# Patient Record
Sex: FEMALE | Race: BLACK OR AFRICAN AMERICAN | NOT HISPANIC OR LATINO | Employment: FULL TIME | URBAN - METROPOLITAN AREA
[De-identification: names, ages, dates, MRNs, and addresses within clinical notes are randomized per-mention and may not be internally consistent; named-entity substitution may affect disease eponyms.]

---

## 2019-06-30 ENCOUNTER — HOSPITAL ENCOUNTER (EMERGENCY)
Facility: HOSPITAL | Age: 24
Discharge: HOME | End: 2019-06-30
Attending: EMERGENCY MEDICINE
Payer: MEDICAID

## 2019-06-30 VITALS
OXYGEN SATURATION: 100 % | WEIGHT: 155 LBS | HEART RATE: 86 BPM | SYSTOLIC BLOOD PRESSURE: 133 MMHG | TEMPERATURE: 99 F | DIASTOLIC BLOOD PRESSURE: 78 MMHG | RESPIRATION RATE: 18 BRPM | HEIGHT: 61 IN | BODY MASS INDEX: 29.27 KG/M2

## 2019-06-30 DIAGNOSIS — Z34.90 PREGNANCY, UNSPECIFIED GESTATIONAL AGE: Primary | ICD-10-CM

## 2019-06-30 LAB
ANION GAP SERPL CALC-SCNC: 7 MMOL/L (ref 8–16)
B-HCG UR QL: POSITIVE
BACTERIA #/AREA URNS AUTO: ABNORMAL /HPF
BASOPHILS # BLD AUTO: 0.01 K/UL (ref 0–0.2)
BASOPHILS NFR BLD: 0.1 % (ref 0–1.9)
BILIRUB UR QL STRIP: NEGATIVE
BUN SERPL-MCNC: 13 MG/DL (ref 6–20)
CALCIUM SERPL-MCNC: 9.1 MG/DL (ref 8.7–10.5)
CHLORIDE SERPL-SCNC: 103 MMOL/L (ref 95–110)
CLARITY UR REFRACT.AUTO: ABNORMAL
CO2 SERPL-SCNC: 23 MMOL/L (ref 23–29)
COLOR UR AUTO: YELLOW
CREAT SERPL-MCNC: 0.7 MG/DL (ref 0.5–1.4)
CTP QC/QA: YES
DIFFERENTIAL METHOD: NORMAL
EOSINOPHIL # BLD AUTO: 0.1 K/UL (ref 0–0.5)
EOSINOPHIL NFR BLD: 0.8 % (ref 0–8)
ERYTHROCYTE [DISTWIDTH] IN BLOOD BY AUTOMATED COUNT: 12.9 % (ref 11.5–14.5)
EST. GFR  (AFRICAN AMERICAN): >60 ML/MIN/1.73 M^2
EST. GFR  (NON AFRICAN AMERICAN): >60 ML/MIN/1.73 M^2
GLUCOSE SERPL-MCNC: 59 MG/DL (ref 70–110)
GLUCOSE UR QL STRIP: NEGATIVE
HCG INTACT+B SERPL-ACNC: NORMAL MIU/ML
HCT VFR BLD AUTO: 37.4 % (ref 37–48.5)
HGB BLD-MCNC: 12.4 G/DL (ref 12–16)
HGB UR QL STRIP: NEGATIVE
IMM GRANULOCYTES # BLD AUTO: 0.04 K/UL (ref 0–0.04)
IMM GRANULOCYTES NFR BLD AUTO: 0.4 % (ref 0–0.5)
KETONES UR QL STRIP: NEGATIVE
LEUKOCYTE ESTERASE UR QL STRIP: NEGATIVE
LYMPHOCYTES # BLD AUTO: 2.3 K/UL (ref 1–4.8)
LYMPHOCYTES NFR BLD: 21.5 % (ref 18–48)
MCH RBC QN AUTO: 30.5 PG (ref 27–31)
MCHC RBC AUTO-ENTMCNC: 33.2 G/DL (ref 32–36)
MCV RBC AUTO: 92 FL (ref 82–98)
MICROSCOPIC COMMENT: ABNORMAL
MONOCYTES # BLD AUTO: 0.9 K/UL (ref 0.3–1)
MONOCYTES NFR BLD: 7.9 % (ref 4–15)
NEUTROPHILS # BLD AUTO: 7.5 K/UL (ref 1.8–7.7)
NEUTROPHILS NFR BLD: 69.3 % (ref 38–73)
NITRITE UR QL STRIP: NEGATIVE
NRBC BLD-RTO: 0 /100 WBC
PH UR STRIP: 7 [PH] (ref 5–8)
PLATELET # BLD AUTO: 257 K/UL (ref 150–350)
PMV BLD AUTO: 9.9 FL (ref 9.2–12.9)
POTASSIUM SERPL-SCNC: 3.7 MMOL/L (ref 3.5–5.1)
PROT UR QL STRIP: NEGATIVE
RBC # BLD AUTO: 4.06 M/UL (ref 4–5.4)
SODIUM SERPL-SCNC: 133 MMOL/L (ref 136–145)
SP GR UR STRIP: 1.02 (ref 1–1.03)
URN SPEC COLLECT METH UR: ABNORMAL
WBC # BLD AUTO: 10.8 K/UL (ref 3.9–12.7)
WBC #/AREA URNS AUTO: 12 /HPF (ref 0–5)

## 2019-06-30 PROCEDURE — 80048 BASIC METABOLIC PNL TOTAL CA: CPT

## 2019-06-30 PROCEDURE — 87186 SC STD MICRODIL/AGAR DIL: CPT | Mod: 59

## 2019-06-30 PROCEDURE — 84702 CHORIONIC GONADOTROPIN TEST: CPT

## 2019-06-30 PROCEDURE — 99283 EMERGENCY DEPT VISIT LOW MDM: CPT

## 2019-06-30 PROCEDURE — 87088 URINE BACTERIA CULTURE: CPT

## 2019-06-30 PROCEDURE — 99282 EMERGENCY DEPT VISIT SF MDM: CPT | Mod: ,,, | Performed by: EMERGENCY MEDICINE

## 2019-06-30 PROCEDURE — 87077 CULTURE AEROBIC IDENTIFY: CPT

## 2019-06-30 PROCEDURE — 81001 URINALYSIS AUTO W/SCOPE: CPT

## 2019-06-30 PROCEDURE — 87086 URINE CULTURE/COLONY COUNT: CPT

## 2019-06-30 PROCEDURE — 81025 URINE PREGNANCY TEST: CPT | Performed by: EMERGENCY MEDICINE

## 2019-06-30 PROCEDURE — 99282 PR EMERGENCY DEPT VISIT,LEVEL II: ICD-10-PCS | Mod: ,,, | Performed by: EMERGENCY MEDICINE

## 2019-06-30 PROCEDURE — 85025 COMPLETE CBC W/AUTO DIFF WBC: CPT

## 2019-06-30 NOTE — ED NOTES
LLQ abd pain that began a few minutes ago. LMP May 4th. UPT + at beginning of this month. Pt has not seen OB yet. Denies vaginal bleeding or spotting. Mirena IUD was removed about one month.

## 2019-07-01 NOTE — ED PROVIDER NOTES
Encounter Date: 6/30/2019    SCRIBE #1 NOTE: I, Isi Briceno, am scribing for, and in the presence of,  Dr. Amanda. I have scribed the entire note.       History     Chief Complaint   Patient presents with    Abdominal Pain     cramping took home preg test +     24 year old female with PMHx of seizures presents to the Ed with slight abdominal cramping to the left side. Patient states that while she previously had intermittent cramping pain, it is currently resolved. Patient states that her last menstrual period was May 4th, and upon taking a pregnancy test at the beginning of June, it was positive. She has had 2 previous pregnancies. Patient also endorses nausea. She denies any back pain, shoulder pain, chest pain, and vaginal bleeding.     She goes to [facility] for OB care, but has not seen a physician regarding her current pregnancy.     The history is provided by the patient.     Review of patient's allergies indicates:   Allergen Reactions    Tree nut Hives     PEANUTS!!!     Past Medical History:   Diagnosis Date    Seizures      History reviewed. No pertinent surgical history.  Family History   Problem Relation Age of Onset    Hypertension Maternal Grandmother     Diabetes Maternal Grandmother     Breast cancer Neg Hx     Colon cancer Neg Hx     Ovarian cancer Neg Hx      Social History     Tobacco Use    Smoking status: Never Smoker   Substance Use Topics    Alcohol use: No    Drug use: No     Review of Systems   Constitutional: Negative for fever.   HENT: Negative for sore throat.    Respiratory: Negative for shortness of breath.    Cardiovascular: Negative for chest pain.   Gastrointestinal: Positive for abdominal pain (Cramping) and nausea.   Genitourinary: Negative for dysuria, vaginal bleeding and vaginal pain.        Pregnant.   Musculoskeletal: Negative for back pain.   Skin: Negative for rash.   Neurological: Negative for weakness.   Hematological: Does not  bruise/bleed easily.       Physical Exam     Initial Vitals [06/30/19 1829]   BP Pulse Resp Temp SpO2   133/80 97 18 98.3 °F (36.8 °C) 100 %      MAP       --         Physical Exam    Nursing note and vitals reviewed.  Constitutional: She is cooperative. She does not appear ill.   HENT:   Head: Normocephalic and atraumatic.   Mouth/Throat: Mucous membranes are normal.   Eyes: No scleral icterus.   Neck: Normal range of motion. Neck supple.   Cardiovascular: Normal rate, regular rhythm and normal heart sounds.   Pulmonary/Chest: No accessory muscle usage. No tachypnea. No respiratory distress.   Abdominal: She exhibits no distension. There is no tenderness.   Musculoskeletal: Normal range of motion.   Neurological: She is alert.   Skin: Skin is warm and dry.         ED Course   Procedures  Labs Reviewed   CULTURE, URINE - Abnormal; Notable for the following components:       Result Value    Urine Culture, Routine   (*)     Value: ESCHERICHIA COLI  >100,000 cfu/ml      All other components within normal limits    Narrative:     Preferred Collection Type->Urine, Clean Catch   BASIC METABOLIC PANEL - Abnormal; Notable for the following components:    Sodium 133 (*)     Glucose 59 (*)     Anion Gap 7 (*)     All other components within normal limits   URINALYSIS, REFLEX TO URINE CULTURE - Abnormal; Notable for the following components:    Appearance, UA Cloudy (*)     All other components within normal limits    Narrative:     Preferred Collection Type->Urine, Clean Catch   URINALYSIS MICROSCOPIC - Abnormal; Notable for the following components:    WBC, UA 12 (*)     Bacteria Many (*)     All other components within normal limits    Narrative:     Preferred Collection Type->Urine, Clean Catch   POCT URINE PREGNANCY - Abnormal; Notable for the following components:    POC Preg Test, Ur Positive (*)     All other components within normal limits   CBC W/ AUTO DIFFERENTIAL   HCG, QUANTITATIVE, PREGNANCY          Imaging  Results    None          Medical Decision Making:   History:   Old Medical Records: I decided to obtain old medical records.  Initial Assessment:   24 year old comes in with very transient LLQ discomfort. She thinks she is pregnant. No vaginal bleeding.   Clinical Tests:   Lab Tests: Ordered and Reviewed              Attending Attestation:             Attending ED Notes:   Patient with a transient left lower quadrant discomfort and states that she has had a positive pregnancy test has not followed up with anyone patient had labs drawn including a beta HCG and these results were given to the patient and she is to follow up with an OB doctor at [facility] as she is previously seen before             Clinical Impression:       ICD-10-CM ICD-9-CM   1. Pregnancy, unspecified gestational age Z34.90 V22.2         Disposition:   Disposition: Discharged  Condition: Stable                        Navneet Amanda MD  07/07/19 0720

## 2019-07-03 LAB — BACTERIA UR CULT: ABNORMAL

## 2019-07-16 LAB
C TRACH RRNA SPEC QL PROBE: NORMAL
HBV SURFACE AG SERPL QL IA: NEGATIVE
HCT VFR BLD AUTO: 37 % (ref 36–46)
HGB BLD-MCNC: 12.3 G/DL (ref 12–16)
HIV 1+2 AB+HIV1 P24 AG SERPL QL IA: NORMAL
INDIRECT COOMBS: NORMAL
MCV RBC AUTO: 93 FL (ref 82–108)
N GONORRHOEAE, AMPLIFIED DNA: NORMAL
PLATELET # BLD AUTO: 285 K/?L (ref 150–399)
RPR: NORMAL
RUBELLA IMMUNE STATUS: 2.61

## 2019-08-06 ENCOUNTER — INITIAL PRENATAL (OUTPATIENT)
Dept: OBSTETRICS AND GYNECOLOGY | Facility: CLINIC | Age: 24
End: 2019-08-06
Payer: MEDICAID

## 2019-08-06 VITALS
WEIGHT: 162.94 LBS | DIASTOLIC BLOOD PRESSURE: 64 MMHG | SYSTOLIC BLOOD PRESSURE: 118 MMHG | BODY MASS INDEX: 30.78 KG/M2

## 2019-08-06 DIAGNOSIS — O30.042 DICHORIONIC DIAMNIOTIC TWIN PREGNANCY IN SECOND TRIMESTER: Primary | ICD-10-CM

## 2019-08-06 DIAGNOSIS — O09.92 SUPERVISION OF HIGH RISK PREGNANCY IN SECOND TRIMESTER: ICD-10-CM

## 2019-08-06 PROBLEM — O30.049 DICHORIONIC DIAMNIOTIC TWIN PREGNANCY: Status: ACTIVE | Noted: 2019-08-06

## 2019-08-06 PROBLEM — O09.90 PREGNANCY, SUPERVISION, HIGH-RISK: Status: ACTIVE | Noted: 2019-08-06

## 2019-08-06 PROCEDURE — 99999 PR PBB SHADOW E&M-EST. PATIENT-LVL III: ICD-10-PCS | Mod: PBBFAC,,, | Performed by: STUDENT IN AN ORGANIZED HEALTH CARE EDUCATION/TRAINING PROGRAM

## 2019-08-06 PROCEDURE — 99202 PR OFFICE/OUTPT VISIT, NEW, LEVL II, 15-29 MIN: ICD-10-PCS | Mod: TH,S$PBB,, | Performed by: STUDENT IN AN ORGANIZED HEALTH CARE EDUCATION/TRAINING PROGRAM

## 2019-08-06 PROCEDURE — 87086 URINE CULTURE/COLONY COUNT: CPT

## 2019-08-06 PROCEDURE — 99202 OFFICE O/P NEW SF 15 MIN: CPT | Mod: TH,S$PBB,, | Performed by: STUDENT IN AN ORGANIZED HEALTH CARE EDUCATION/TRAINING PROGRAM

## 2019-08-06 PROCEDURE — 99999 PR PBB SHADOW E&M-EST. PATIENT-LVL III: CPT | Mod: PBBFAC,,, | Performed by: STUDENT IN AN ORGANIZED HEALTH CARE EDUCATION/TRAINING PROGRAM

## 2019-08-06 PROCEDURE — 99213 OFFICE O/P EST LOW 20 MIN: CPT | Mod: PBBFAC,TH,PO | Performed by: STUDENT IN AN ORGANIZED HEALTH CARE EDUCATION/TRAINING PROGRAM

## 2019-08-06 RX ORDER — FOLIC ACID 1 MG/1
1 TABLET ORAL DAILY
Qty: 100 TABLET | Refills: 3 | Status: ON HOLD | OUTPATIENT
Start: 2019-08-06 | End: 2019-12-29 | Stop reason: HOSPADM

## 2019-08-06 RX ORDER — ASPIRIN 81 MG/1
81 TABLET ORAL DAILY
Qty: 30 TABLET | Refills: 8 | Status: ON HOLD | OUTPATIENT
Start: 2019-08-06 | End: 2019-12-29 | Stop reason: HOSPADM

## 2019-08-06 NOTE — PROGRESS NOTES
Complaints today: fatigue. Last HH normal. Is taking PNV.    OB:  Denies contractions  Denies vb   Denies LOF  Endorses fetal movement    /64   Wt 73.9 kg (162 lb 14.7 oz)   LMP 2019   Breastfeeding? Unknown   BMI 30.78 kg/m²     24 y.o., at 14w0d by Estimated Date of Delivery: 20  Patient Active Problem List   Diagnosis    Pregnancy, supervision, high-risk    Dichorionic diamniotic twin pregnancy     OB History    Para Term  AB Living   3 2 2     2   SAB TAB Ectopic Multiple Live Births         0 2      # Outcome Date GA Lbr Eric/2nd Weight Sex Delivery Anes PTL Lv   3 Current            2 Term 02/15/18 40w0d  3.09 kg (6 lb 13 oz) M Vag-Spont  N WILEY   1 Term 14 39w1d  3.075 kg (6 lb 12.5 oz) M Vag-Spont Spinal, EPI N WILEY      Complications: Nuchal cord       Dating reviewed    Allergies and problem list reviewed and updated    Medical and surgical history reviewed    Prenatal labs reviewed and updated    Physical Exam:  CV: regular rate and rhythm  Pulm: CTAB  ABD: soft, gravid, nontender, fundus firm    Assessment:  Miguel Angel was seen today for initial prenatal visit.    Diagnoses and all orders for this visit:    Dichorionic diamniotic twin pregnancy in second trimester  -     folic acid (FOLVITE) 1 MG tablet; Take 1 tablet (1 mg total) by mouth once daily.  -     Ambulatory consult to Maternal Fetal Medicine  -     US MFM Procedure (Viewpoint); Future    Supervision of high risk pregnancy in second trimester  -     Urine culture    Other orders  -     aspirin (ECOTRIN) 81 MG EC tablet; Take 1 tablet (81 mg total) by mouth once daily.        Plan:    follow up 4 Weeks, kick counts, labor precautions

## 2019-08-07 LAB — BACTERIA UR CULT: NORMAL

## 2019-09-10 ENCOUNTER — INITIAL CONSULT (OUTPATIENT)
Dept: MATERNAL FETAL MEDICINE | Facility: CLINIC | Age: 24
End: 2019-09-10
Payer: MEDICAID

## 2019-09-10 ENCOUNTER — PROCEDURE VISIT (OUTPATIENT)
Dept: MATERNAL FETAL MEDICINE | Facility: CLINIC | Age: 24
End: 2019-09-10
Payer: MEDICAID

## 2019-09-10 VITALS
DIASTOLIC BLOOD PRESSURE: 74 MMHG | BODY MASS INDEX: 31.41 KG/M2 | WEIGHT: 166.25 LBS | SYSTOLIC BLOOD PRESSURE: 118 MMHG

## 2019-09-10 DIAGNOSIS — Z36.89 ENCOUNTER FOR FETAL ANATOMIC SURVEY: ICD-10-CM

## 2019-09-10 DIAGNOSIS — Z36.89 ENCOUNTER FOR ULTRASOUND TO CHECK FETAL GROWTH: Primary | ICD-10-CM

## 2019-09-10 DIAGNOSIS — O30.042 DICHORIONIC DIAMNIOTIC TWIN PREGNANCY IN SECOND TRIMESTER: ICD-10-CM

## 2019-09-10 PROCEDURE — 99212 OFFICE O/P EST SF 10 MIN: CPT | Mod: PBBFAC,TH,25 | Performed by: OBSTETRICS & GYNECOLOGY

## 2019-09-10 PROCEDURE — 76817 TRANSVAGINAL US OBSTETRIC: CPT | Mod: PBBFAC | Performed by: OBSTETRICS & GYNECOLOGY

## 2019-09-10 PROCEDURE — 99204 OFFICE O/P NEW MOD 45 MIN: CPT | Mod: 25,S$PBB,TH, | Performed by: OBSTETRICS & GYNECOLOGY

## 2019-09-10 PROCEDURE — 76812 OB US DETAILED ADDL FETUS: CPT | Mod: PBBFAC | Performed by: OBSTETRICS & GYNECOLOGY

## 2019-09-10 PROCEDURE — 76817 PR US, OB, TRANSVAG APPROACH: ICD-10-PCS | Mod: 26,S$PBB,, | Performed by: OBSTETRICS & GYNECOLOGY

## 2019-09-10 PROCEDURE — 99204 PR OFFICE/OUTPT VISIT, NEW, LEVL IV, 45-59 MIN: ICD-10-PCS | Mod: 25,S$PBB,TH, | Performed by: OBSTETRICS & GYNECOLOGY

## 2019-09-10 PROCEDURE — 99999 PR PBB SHADOW E&M-EST. PATIENT-LVL II: ICD-10-PCS | Mod: PBBFAC,,, | Performed by: OBSTETRICS & GYNECOLOGY

## 2019-09-10 PROCEDURE — 99999 PR PBB SHADOW E&M-EST. PATIENT-LVL II: CPT | Mod: PBBFAC,,, | Performed by: OBSTETRICS & GYNECOLOGY

## 2019-09-10 PROCEDURE — 76811 OB US DETAILED SNGL FETUS: CPT | Mod: 26,S$PBB,, | Performed by: OBSTETRICS & GYNECOLOGY

## 2019-09-10 PROCEDURE — 76811 PR US, OB FETAL EVAL & EXAM, TRANSABDOM,FIRST GESTATION: ICD-10-PCS | Mod: 26,S$PBB,, | Performed by: OBSTETRICS & GYNECOLOGY

## 2019-09-10 PROCEDURE — 76812 OB US DETAILED ADDL FETUS: ICD-10-PCS | Mod: 26,S$PBB,, | Performed by: OBSTETRICS & GYNECOLOGY

## 2019-09-10 PROCEDURE — 76817 TRANSVAGINAL US OBSTETRIC: CPT | Mod: 26,S$PBB,, | Performed by: OBSTETRICS & GYNECOLOGY

## 2019-09-10 PROCEDURE — 76812 OB US DETAILED ADDL FETUS: CPT | Mod: 26,S$PBB,, | Performed by: OBSTETRICS & GYNECOLOGY

## 2019-09-10 PROCEDURE — 76811 OB US DETAILED SNGL FETUS: CPT | Mod: PBBFAC | Performed by: OBSTETRICS & GYNECOLOGY

## 2019-09-10 NOTE — LETTER
September 13, 2019      Ana Laura Douglas MD  [address]  [address]  [place] [address]           [facility] [address]  [address]  [place] [address]  Phone: [phone]          Patient: Miguel Angel Moreno   MR Number: [id number]   YOB: 1995   Date of Visit: 9/10/2019       Dear Dr. Ana Laura Douglas:    Thank you for referring Miguel Angel Moreno to me for evaluation. Attached you will find relevant portions of my assessment and plan of care.    If you have questions, please do not hesitate to call me. I look forward to following Miguel Angel Moreno along with you.    Sincerely,    Hannah Terrazas MD    Enclosure  CC:  No Recipients    If you would like to receive this communication electronically, please contact [email or web address] or [phone] to request more information on [employer] access.    For providers and/or their staff who would like to refer a patient to [facility], please contact us through our one-stop-shop provider referral line, [facility], at [phone].    If you feel you have received this communication in error or would no longer like to receive these types of communications, please e-mail [email or web address]

## 2019-09-13 NOTE — PROGRESS NOTES
Indication  ========    Twin fetal anatomy survey    Maternal Assessment  =================    BP syst 118 mmHg  BP diast 74 mmHg    Method  ======    Transabdominal ultrasound examination, Transvaginal ultrasound examination, Voluson E10. View: Suboptimal view: limited by fetal position    Pregnancy  =========    Twin pregnancy. Dichorionic-diamniotic. Number of fetuses: 2    Dating  ======    GA by prior assessment 19 w + 0 d  NOLBERTO by prior assessment: 2/4/2020  Ultrasound examination on: 9/10/2019  GA by U/S based upon: AC, BPD, Femur, HC  GA by U/S 19 w + 1 d  NOLBERTO by U/S: 2/3/2020  GA by U/S based upon (Fetus 2): AC, BPD, Femur, HC  GA by U/S (Fetus 2) 18 w + 6 d  NOLBERTO by U/S (Fetus 2): 2/5/2020  Assigned: based on stated NOLBERTO, selected on 09/10/2019  Assigned GA 19 w + 0 d  Assigned NOLBERTO: 2/4/2020  Pregnancy length 280 d    General Evaluation (Fetus 1)  =====================    Cardiac activity present.  bpm.  Fetal movements visualized.  Presentation cephalic, rlq.  Placenta anterior.  Umbilical cord normal, 3 vessel cord, normal insertion.  Amniotic fluid Amount of AF: normal amount. MVP 4.7 cm.    Fetal Biometry (Fetus 1)  ===================    Standard  BPD 45.5 mm  19w 5d                Hadlock    OFD 54.5 mm  19w 3d                Garima    .9 mm  18w 6d                Hadlock    Cerebellum tr 19.3 mm  19w 2d                Barragan    Nuchal fold 4.6 mm  .8 mm  18w 5d                Hadlock    Femur 29.4 mm  19w 0d                Hadlock    Humerus 28.6 mm  19w 2d                Garima    HC / AC 1.20     g    EFW discordance 1.9 %  EFW (lb) 0 lb  EFW (oz) 9 oz  EFW by: Hadlock (BPD-HC-AC-FL)  Extended   5.1 mm  CM 3.3 mm    Head / Face / Neck  Cephalic index 0.83    Extremities / Bony Struc  FL / BPD 0.65    FL / AC 0.22    Other Structures   bpm    Fetal Anatomy (Fetus 1)  ==================    Cranium: normal  Lateral ventricles: normal  Choroid plexus: normal  Midline  falx: normal  Cavum septi pellucidi: normal  Cerebellum: normal  Cisterna magna: normal  Head / Neck  Neck: suboptimal  Lips: suboptimal  Profile: suboptimal  Nose: suboptimal  RVOT view: suboptimal  LVOT view: suboptimal  Heart / Thorax  4-chamber view: 4-chamber suboptimal, septum suboptimal  Situs: situs solitus (normal)  Aortic arch view: normal  Ductal arch view: normal  SVC: normal  IVC: normal  3-vessel view: suboptimal  3-vessel-trachea view: suboptimal  Cardiac rhythm: regular (normal)  Rt lung: normal  Lt lung: normal  Diaphragm: suboptimal  Cord insertion: normal  Stomach: normal  Kidneys: normal  Bladder: normal  Genitals: normal  Abdomen  Liver: normal  Bowel: normal  Rt renal artery: suboptimal  Lt renal artery: suboptimal  Spine: sagittal normal at all levels, transverse suboptimal  Arms: both visualized  Legs: both visualized  Rt arm: normal  Lt arm: normal  Rt hand: visualized  Lt hand: visualized  Rt leg: normal  Lt leg: normal  Rt foot: visualized  Lt foot: visualized  Position of feet: normal  Gender: male  Wants to know gender: yes    General Evaluation (Fetus 2)  =====================    Cardiac activity present.  bpm.  Fetal movements visualized.  Presentation vertex, luq.  Placenta posterior.  Umbilical cord 3 vessel cord.  Amniotic fluid normal amount, MVP 3.9 cm.    Fetal Biometry (Fetus 2)  ===================    Standard  BPD 42.3 mm  18w 6d                Hadlock    OFD 54.4 mm  19w 2d                Garima    .6 mm  18w 4d                Hadlock    Cerebellum tr 19.3 mm  19w 2d                Barragan    Nuchal fold 3.9 mm  .1 mm  19w 2d                Hadlock    Femur 28.8 mm  18w 6d                Hadlock    Humerus 27.2 mm  18w 5d                Garima    HC / AC 1.13     g    EFW discordance 1.9 %  EFW (lb) 0 lb  EFW (oz) 10 oz  EFW by: Hadlock (BPD-HC-AC-FL)  Extended   7.2 mm  CM 3.3 mm    Nasal bone 5.7 mm    Head / Face / Neck  Cephalic  index 0.78    Extremities / Bony Struc  FL / BPD 0.68    FL / AC 0.21    Other Structures   bpm    Fetal Anatomy (Fetus 2)  ==================    Cranium: normal  Lateral ventricles: normal  Choroid plexus: normal  Midline falx: normal  Cavum septi pellucidi: suboptimal  Cerebellum: normal  Cisterna magna: normal  Head / Neck  Neck: normal  Nuchal fold: normal  Lips: normal  Profile: normal  Nose: normal  RVOT view: suboptimal  LVOT view: suboptimal  Heart / Thorax  4-chamber view: 4-chamber suboptimal, septum suboptimal  Situs: situs solitus (normal)  Aortic arch view: suboptimal  Ductal arch view: suboptimal  SVC: suboptimal  IVC: normal  3-vessel view: suboptimal  3-vessel-trachea view: suboptimal  Cardiac rhythm: regular (normal)  Rt lung: suboptimal  Lt lung: suboptimal  Diaphragm: normal  Cord insertion: normal  Stomach: normal  Kidneys: normal  Bladder: normal  Genitals: normal  Abdomen  Liver: normal  Bowel: normal  Rt renal artery: visualized  Lt renal artery: visualized  Spine: transverse spine normal at all levels, sagittal suboptimal at all levels  Arms: both visualized  Legs: both visualized  Rt arm: normal  Lt arm: normal  Rt hand: visualized  Lt hand: visualized  Rt leg: normal  Lt leg: normal  Rt foot: visualized  Lt foot: visualized  Gender: male  Wants to know gender: yes    Maternal Structures  ===============    Uterus / Cervix  Approach: Transvaginal  Cervical length 43.5 mm    Consultation  ==========    Type: Twins/seizures  The patient is a 24-year-old  002 who is at 19 weeks gestation based on a first-trimester ultrasound. Her doctors elected to use the  ultrasound rather than LMP dating. She denies any history of seizure disorder except for seizure 1 time when she was a baby and she is  unsure if this is a febrile seizure.    Past medical history: History of a seizure 1 time when she was a baby  past surgical history: None  social history: None  family history of birth defects:  None, no history of sickle cell  medications: Prenatal vitamins, and she was on antibiotics for urinary tract infection  past OB history: Spontaneous vaginal delivery at 39 and 1 weeks gestation baby was 6 lb 12.5 oz, spontaneous vaginal delivery at 40 weeks  gestation baby was 6 lb 13 oz  allergies: Tree nuts, peanuts and nuts and general  decline screening and diagnostic testing for aneuploidy and open neural tube defects    assessment and plan:  1. IUP at 19 weeks - dichorionic diamniotic twin gestation  2: History of seizures: Is highly unlikely that this will impact her pregnancy. She does not have a history of epilepsy. She had 1 seizure as a  child and it is possible this could have been a febrile seizure.  3. Dichorionic diamniotic twin gestation:  I counseled the patient with the risks associated with twin pregnancy. These include but are not limited to  labor and  delivery,gestational diabetes, preeclampsia, IUGR and/or discordant twins, malpresentation, congenital anomalies, postpartum hemorrhage,  demise, and   delivery. The patient voiced understanding of these risks. We discussed that a twin gestation increases the risk of nearly all  pregnancy related complications. We also discussed the increased risk of cerebral palsy in multiple gestations. A detailed fetal anatomic  survey is recommended at 19 to 20 weeks gestation. Subsequent to this, serial growth ultrasounds are recommended every 4 weeks provided  fetal growth remains normal.   fetal surveillance is recommended at 32 weeks with weekly BPP or weekly NST/MVP. If discordance (difference in EFW's > 20%) or  other complications develop, then more intense  fetal surveillance should be performed.  Given the multiple gestation, I would recommend increasing her folic acid supplementation by at least another 1mg daily and consideration of a  dosage of 4mg total daily.  To reduce the risk of preeclampsia, I would recommend  baby aspirin 81mg PO daily after 12-14 weeks of gestation.  Recommend delivery at 38 weeks or earlier if indicated.    Genetic screening in twins:We discussed the patient's age related risk of aneuploidy. We discussed if the twins are dizygous that her age  related risk is the risk for each fetus but that the total aneuploidy risk for the mother is higher due to the multiple fetuses. Monozygous twins  most often have the same chromosomal complement and the risk of carrying aneuploid fetuses is similar to the age risk of the mother. Twins of  discordant gender are typically dizygous. Wereviewed second trimester screening detecting about 50% of cases of Down syndrome in twins.  The patient was also informed of the options of diagnostic testing for aneuploidy via amniocentesis and the risks associated with this vs in  chavez pregnancies. We discussed that ACOG does not recommend that NIPT be performed on twins given the limited data. Patient  declines screening/diagnostic testing for aneuploidy/ONTD.    45 min was spent face-to-face time with greater than half that time spent in counseling and coordination of care.      Impression  =========    Live dichorionic/diamniotic twin intrauterine pregnancy.  Fetus A: Biometry agrees with the clinical dating.  Normal amniotic fluid volume.  Some of the fetal anatomy was suboptimally visualized. The fetal anatomy that was seen appeared normal.  Placenta is anterior without previa.  Fetus B: Biometry agrees with the clinical dating.  Normal amniotic fluid volume.  Some of the fetal anatomy was suboptimally visualized. The fetal anatomy that was seen appeared normal.  Placenta is posterior without previa.  Transvaginal cervical length is normal.  1.9% discordance.    Recommendation  ==============    Follow up ultrasound in 4 weeks for growth/suboptimal fetal anatomy.  Patient declined screening/diagnostic testing for aneuploidy.  Baby asa daily and primary ob should be sure  appropriate folic acid supplement.  Serial growth,  fetal surveillance at 32 weeks, delivery at 38 weeks if no indication for earlier delivery.  See note above.

## 2019-10-10 ENCOUNTER — PROCEDURE VISIT (OUTPATIENT)
Dept: MATERNAL FETAL MEDICINE | Facility: CLINIC | Age: 24
End: 2019-10-10
Payer: MEDICAID

## 2019-10-10 DIAGNOSIS — Z36.89 ENCOUNTER FOR ULTRASOUND TO CHECK FETAL GROWTH: ICD-10-CM

## 2019-10-10 DIAGNOSIS — O30.042 DICHORIONIC DIAMNIOTIC TWIN PREGNANCY IN SECOND TRIMESTER: ICD-10-CM

## 2019-10-10 PROCEDURE — 76816 OB US FOLLOW-UP PER FETUS: CPT | Mod: 26,S$PBB,, | Performed by: OBSTETRICS & GYNECOLOGY

## 2019-10-10 PROCEDURE — 76816 PR  US,PREGNANT UTERUS,F/U,TRANSABD APP: ICD-10-PCS | Mod: 26,59,S$PBB, | Performed by: OBSTETRICS & GYNECOLOGY

## 2019-10-10 PROCEDURE — 76816 OB US FOLLOW-UP PER FETUS: CPT | Mod: 59,PBBFAC | Performed by: OBSTETRICS & GYNECOLOGY

## 2019-10-29 ENCOUNTER — TELEPHONE (OUTPATIENT)
Dept: OBSTETRICS AND GYNECOLOGY | Facility: CLINIC | Age: 24
End: 2019-10-29

## 2019-10-29 NOTE — TELEPHONE ENCOUNTER
----- Message from Jairo Cool sent at 10/29/2019 12:12 PM CDT -----  Contact: BROOKLYN DRAPER [[id number]]  Name of Who is Calling: BROOKLYN DRAPER [[id number]]      What is the request in detail: Would like to speak with staff in regards to knowing if [facility] paperwork was received. Please advise      Can the clinic reply by [facility]: yes      What Number to Call Back if not in [facility]: [phone]

## 2019-10-29 NOTE — TELEPHONE ENCOUNTER
Returned call. Emailed disability cover sheet to [email or web address] per pt request. Pt voiced understanding.

## 2019-10-29 NOTE — TELEPHONE ENCOUNTER
Has not been seen since august.  Please schedule prenatal asap.  Please ask if she has changed providers.

## 2019-11-12 ENCOUNTER — INITIAL CONSULT (OUTPATIENT)
Dept: MATERNAL FETAL MEDICINE | Facility: CLINIC | Age: 24
End: 2019-11-12
Payer: MEDICAID

## 2019-11-12 ENCOUNTER — PROCEDURE VISIT (OUTPATIENT)
Dept: MATERNAL FETAL MEDICINE | Facility: CLINIC | Age: 24
End: 2019-11-12
Payer: MEDICAID

## 2019-11-12 DIAGNOSIS — O35.EXX0 FETAL RENAL ANOMALY, SINGLE GESTATION: ICD-10-CM

## 2019-11-12 DIAGNOSIS — O30.043 DICHORIONIC DIAMNIOTIC TWIN PREGNANCY IN THIRD TRIMESTER: ICD-10-CM

## 2019-11-12 DIAGNOSIS — Z36.89 ENCOUNTER FOR ULTRASOUND TO CHECK FETAL GROWTH: ICD-10-CM

## 2019-11-12 DIAGNOSIS — O30.042 DICHORIONIC DIAMNIOTIC TWIN PREGNANCY IN SECOND TRIMESTER: ICD-10-CM

## 2019-11-12 PROCEDURE — 99213 PR OFFICE/OUTPT VISIT, EST, LEVL III, 20-29 MIN: ICD-10-PCS | Mod: 25,S$PBB,TH, | Performed by: OBSTETRICS & GYNECOLOGY

## 2019-11-12 PROCEDURE — 76816 OB US FOLLOW-UP PER FETUS: CPT | Mod: 59,PBBFAC | Performed by: OBSTETRICS & GYNECOLOGY

## 2019-11-12 PROCEDURE — 76816 PR  US,PREGNANT UTERUS,F/U,TRANSABD APP: ICD-10-PCS | Mod: 26,59,S$PBB, | Performed by: OBSTETRICS & GYNECOLOGY

## 2019-11-12 PROCEDURE — 99213 OFFICE O/P EST LOW 20 MIN: CPT | Mod: 25,S$PBB,TH, | Performed by: OBSTETRICS & GYNECOLOGY

## 2019-11-12 PROCEDURE — 76816 OB US FOLLOW-UP PER FETUS: CPT | Mod: 26,S$PBB,, | Performed by: OBSTETRICS & GYNECOLOGY

## 2019-11-12 NOTE — LETTER
November 12, 2019      Ana Luara Douglas MD  [address]  [address]  [facility] [place] [facility] [address]           [facility] [address]  [address]  [facility] [address]  Phone: [phone]          Patient: Miguel Angel Moreno   MR Number: [id number]   YOB: 1995   Date of Visit: 11/12/2019       Dear Dr. Ana Laura Douglas:    Thank you for referring Miguel Angel Moreno to me for evaluation. Attached you will find relevant portions of my assessment and plan of care.    If you have questions, please do not hesitate to call me. I look forward to following Miguel Angel Moreno along with you.    Sincerely,    Margaret Villarreal MD    Enclosure  CC:  No Recipients    If you would like to receive this communication electronically, please contact [email or web address] or [phone] to request more information on [employer] Link access.    For providers and/or their staff who would like to refer a patient to [facility], please contact us through our one-stop-shop provider referral line, [facility], at [phone].    If you feel you have received this communication in error or would no longer like to receive these types of communications, please e-mail [email or web address]

## 2019-11-12 NOTE — PROGRESS NOTES
OB Ultrasound Report and consult note (see PDF version under imaging tab)    Indication  ========    Follow-up evaluation of anatomy. Follow-up evaluation for fetal growth; merlene twins    Pregnancy History  ===============    Genetic screening declined    Fetal Growth Overview (Fetus 1)  ========================    Exam date         GA              BPD (mm)         HC (mm)        AC (mm)        FL (mm)         HL (mm)        EFW (g)  09/10/2019        19w 0d        45.5                  159.9             132.8             29.4              28.6              265  10/10/2019        23w 2d        57.6                  210.8             180.7             40.1                                   552    32%  11/12/2019        28w 0d         71.9                 257.6             229.5             51.3                                   1,087    36%      Fetal Growth Overview (Fetus 2)  ========================    Exam date         GA              BPD (mm)         HC (mm)        AC (mm)        FL (mm)         HL (mm)        EFW (g)  09/10/2019        19w 0d        42.3                  156.6             139.1             28.8              27.2              270  10/10/2019        23w 2d        56.6                  215.7             188.3             39.1                                   572    38%  11/12/2019        28w 0d         70.1                 263.6             238.9             52.3                                   1,178    48%      Method  ======    Transabdominal ultrasound examination, 2D Color Doppler, Voluson E10. View: Good view    Pregnancy  =========    Twin pregnancy. Dichorionic-diamniotic. Number of fetuses: 2    Dating  ======    Cycle: regular cycle,  GA by prior assessment 28 w + 0 d  NOLBERTO by prior assessment: 2/4/2020  Ultrasound examination on: 11/12/2019  GA by U/S based upon: AC, BPD, Femur, HC  GA by U/S 27 w + 6 d  NOLBERTO by U/S: 2/5/2020  GA by U/S based upon (Fetus 2): AC, BPD, Femur, HC  GA by U/S  (Fetus 2) 28 w + 2 d  NOLBERTO by U/S (Fetus 2): 2/2/2020  Assigned: based on stated NOLBERTO, selected on 09/10/2019  Assigned GA 28 w + 0 d  Assigned NOLBERTO: 2/4/2020  Pregnancy length 280 d    General Evaluation (Fetus 1)  ======================    Cardiac activity present.  bpm.  Fetal movements visualized.  Presentation cephalic; rlq.  Placenta Placental site: anterior.  Umbilical cord Cord vessels: 3 vessel cord.  Amniotic fluid Amount of AF: normal amount. MVP 4.3 cm.    Fetal Biometry (Fetus 1)  ===================    Standard  BPD 71.9 mm  28w 6d                Hadlock    OFD 90.2 mm  29w 0d                Garima    .6 mm  28w 0d                Hadlock    .5 mm  27w 2d                Hadlock    Femur 51.3 mm  27w 3d                Hadlock    HC / AC 1.12    EFW 1,087 g          36%        Selvin    EFW discordance 7.7 %  EFW (lb) 2 lb  EFW (oz) 6 oz  EFW by: Hadlock (BPD-HC-AC-FL)  Head / Face / Neck  Cephalic index 0.80    Extremities / Bony Struc  FL / BPD 0.71    FL / AC 0.22    Other Structures   bpm    Fetal Anatomy (Fetus 1)  ==================    Cranium: normal  4-chamber view: normal  RVOT view: normal  LVOT view: normal  Stomach: normal  Kidneys: normal  Bladder: normal  Gender: male  Wants to know gender: yes    General Evaluation (Fetus 2)  ======================    Cardiac activity present.  bpm.  Fetal movements visualized.  Presentation breech; luq.  Placenta Placental site: posterior, left.  Umbilical cord Cord vessels: 3 vessel cord.  Amniotic fluid Amount of AF: normal amount. MVP 7.5 cm.    Fetal Biometry (Fetus 2)  ===================    Standard  BPD 70.1 mm  28w 1d                Hadlock    OFD 94.8 mm  30w 4d                Garima    .6 mm  28w 5d                Hadlock    .9 mm  28w 1d                Hadlock    Femur 52.3 mm  27w 6d                Hadlock    HC / AC 1.10    EFW 1,178 g          48%        Selvin    EFW discordance 7.7 %  EFW  (lb) 2 lb  EFW (oz) 10 oz  EFW by: Hadlock (BPD-HC-AC-FL)  Head / Face / Neck  Cephalic index 0.74    Extremities / Bony Struc  FL / BPD 0.75    FL / AC 0.22    Other Structures   bpm    Fetal Anatomy (Fetus 2)  ==================    Cranium: normal  4-chamber view: documented previously  LVOT view: normal  Heart / Thorax  Ductal arch view: normal  Stomach: normal  Bladder: normal  Abdomen  Rt kidney: 6mm cyst  Lt kidney: normal  Gender: male  Wants to know gender: yes    Consultation  ==========    Type: Abnormal Ultrasound Finding  I spent 15 minutes on the consultation today with the patient and her partner regarding findings from today's ultrasound exam. More  than 50% of the consultation was spent in face-to-face counseling and coordination of care.    Referring MD: Dr. Douglas    A small cyst is identified in the posterior, mid/lower portion of the right kidney in twin B. The kidney is otherwise normal in appearance,  and no cysts are seen in the left kidney. A small isolated cyst of the kidney is unlikely to cause fetal renal problems and is unlikely to  be indicative of more serious cystic renal disease. Will continue to follow prenatally.  evaluation by Pediatrics is  recommended.    Impression  =========    1. Dichorionic twin IUP - 28 weeks 0 days  2. Fetal sizes are AGA and concordant  3. Fetal anatomic surveys completed --- no structural abnormalities seen in twin A; small right renal cyst seen in twin B    Recommendation  ==============    F/U growth study in 4 weeks --- will reassess the right renal cyst in twin B at that time

## 2019-11-13 ENCOUNTER — ROUTINE PRENATAL (OUTPATIENT)
Dept: OBSTETRICS AND GYNECOLOGY | Facility: CLINIC | Age: 24
End: 2019-11-13
Payer: MEDICAID

## 2019-11-13 VITALS
DIASTOLIC BLOOD PRESSURE: 64 MMHG | BODY MASS INDEX: 34.45 KG/M2 | SYSTOLIC BLOOD PRESSURE: 116 MMHG | WEIGHT: 182.31 LBS

## 2019-11-13 DIAGNOSIS — O30.043 DICHORIONIC DIAMNIOTIC TWIN PREGNANCY IN THIRD TRIMESTER: Primary | ICD-10-CM

## 2019-11-13 PROCEDURE — 99999 PR PBB SHADOW E&M-EST. PATIENT-LVL III: CPT | Mod: PBBFAC,,, | Performed by: OBSTETRICS & GYNECOLOGY

## 2019-11-13 PROCEDURE — 99213 OFFICE O/P EST LOW 20 MIN: CPT | Mod: PBBFAC,TH,PO | Performed by: OBSTETRICS & GYNECOLOGY

## 2019-11-13 PROCEDURE — 99999 PR PBB SHADOW E&M-EST. PATIENT-LVL III: ICD-10-PCS | Mod: PBBFAC,,, | Performed by: OBSTETRICS & GYNECOLOGY

## 2019-11-13 PROCEDURE — 99213 PR OFFICE/OUTPT VISIT, EST, LEVL III, 20-29 MIN: ICD-10-PCS | Mod: TH,S$PBB,, | Performed by: OBSTETRICS & GYNECOLOGY

## 2019-11-13 PROCEDURE — 99213 OFFICE O/P EST LOW 20 MIN: CPT | Mod: TH,S$PBB,, | Performed by: OBSTETRICS & GYNECOLOGY

## 2019-11-13 PROCEDURE — 90471 IMMUNIZATION ADMIN: CPT | Mod: PBBFAC,PO

## 2019-11-13 NOTE — PROGRESS NOTES
Complaints today: None. Good FM. No VB, LOF, ctx.       /64   Wt 82.7 kg (182 lb 5.1 oz)   LMP 2019   BMI 34.45 kg/m²     24 y.o., at 28w1d by Estimated Date of Delivery: 20  Patient Active Problem List   Diagnosis    Pregnancy, supervision, high-risk/flu    Dichorionic diamniotic twin pregnancy     OB History    Para Term  AB Living   3 2 2     2   SAB TAB Ectopic Multiple Live Births         0 2      # Outcome Date GA Lbr Eric/2nd Weight Sex Delivery Anes PTL Lv   3 Current            2 Term 02/15/18 40w0d  3.09 kg (6 lb 13 oz) M Vag-Spont  N WILEY   1 Term 14 39w1d  3.075 kg (6 lb 12.5 oz) M Vag-Spont Spinal, EPI N WILEY      Complications: Nuchal cord       Dating reviewed    Allergies and problem list reviewed and updated    Medical and surgical history reviewed    Prenatal labs reviewed and updated    Physical Exam:  ABD: soft, gravid, nontender, no CVA    Assessment:  28w1d here for routine prenatal    Plan:   - OB glucose, CBC ordered  - considering mirena v nexplanon  - Tdap today

## 2019-12-03 ENCOUNTER — TELEPHONE (OUTPATIENT)
Dept: OBSTETRICS AND GYNECOLOGY | Facility: CLINIC | Age: 24
End: 2019-12-03

## 2019-12-03 NOTE — TELEPHONE ENCOUNTER
Pt called clinic c/o pelvic pain and back pain-back pain all night and was unable to sleep.  Pt states babies moving, no lof or vag bld.  Just c/o pain.  Instructed pt to go to [facility].  Pt verbalized understanding

## 2019-12-11 ENCOUNTER — PROCEDURE VISIT (OUTPATIENT)
Dept: MATERNAL FETAL MEDICINE | Facility: CLINIC | Age: 24
End: 2019-12-11
Payer: MEDICAID

## 2019-12-11 DIAGNOSIS — Z36.89 ENCOUNTER FOR ULTRASOUND TO CHECK FETAL GROWTH: ICD-10-CM

## 2019-12-11 DIAGNOSIS — O30.042 DICHORIONIC DIAMNIOTIC TWIN PREGNANCY IN SECOND TRIMESTER: ICD-10-CM

## 2019-12-11 PROCEDURE — 76816 OB US FOLLOW-UP PER FETUS: CPT | Mod: 59,PBBFAC | Performed by: PEDIATRICS

## 2019-12-11 PROCEDURE — 76816 PR  US,PREGNANT UTERUS,F/U,TRANSABD APP: ICD-10-PCS | Mod: 26,S$PBB,, | Performed by: PEDIATRICS

## 2019-12-11 PROCEDURE — 76819 FETAL BIOPHYS PROFIL W/O NST: CPT | Mod: 59,PBBFAC | Performed by: PEDIATRICS

## 2019-12-11 PROCEDURE — 76819 FETAL BIOPHYS PROFIL W/O NST: CPT | Mod: 26,59,S$PBB, | Performed by: PEDIATRICS

## 2019-12-11 PROCEDURE — 76816 OB US FOLLOW-UP PER FETUS: CPT | Mod: 26,S$PBB,, | Performed by: PEDIATRICS

## 2019-12-11 PROCEDURE — 76819 PR US, OB, FETAL BIOPHYSICAL, W/O NST: ICD-10-PCS | Mod: 26,59,S$PBB, | Performed by: PEDIATRICS

## 2019-12-12 ENCOUNTER — TELEPHONE (OUTPATIENT)
Dept: OBSTETRICS AND GYNECOLOGY | Facility: CLINIC | Age: 24
End: 2019-12-12

## 2019-12-12 ENCOUNTER — PATIENT MESSAGE (OUTPATIENT)
Dept: OBSTETRICS AND GYNECOLOGY | Facility: CLINIC | Age: 24
End: 2019-12-12

## 2019-12-12 NOTE — TELEPHONE ENCOUNTER
Called pt to schedule OB appt and to discuss FMLA process. No answer. Left VM message. Sent X BODYhart message.

## 2019-12-12 NOTE — TELEPHONE ENCOUNTER
----- Message from Smith Evans sent at 12/12/2019  9:37 AM CST -----  Pt returning nurse phone call. Pt can be reached at [phone].

## 2019-12-18 ENCOUNTER — PATIENT MESSAGE (OUTPATIENT)
Dept: OBSTETRICS AND GYNECOLOGY | Facility: CLINIC | Age: 24
End: 2019-12-18

## 2019-12-19 ENCOUNTER — HOSPITAL ENCOUNTER (EMERGENCY)
Facility: OTHER | Age: 24
Discharge: HOME | End: 2019-12-19
Attending: OBSTETRICS & GYNECOLOGY
Payer: MEDICAID

## 2019-12-19 ENCOUNTER — HOSPITAL ENCOUNTER (OUTPATIENT)
Dept: PERINATAL CARE | Facility: OTHER | Age: 24
Discharge: HOME | End: 2019-12-19
Attending: OBSTETRICS & GYNECOLOGY
Payer: MEDICAID

## 2019-12-19 VITALS
HEART RATE: 108 BPM | RESPIRATION RATE: 18 BRPM | DIASTOLIC BLOOD PRESSURE: 69 MMHG | OXYGEN SATURATION: 99 % | SYSTOLIC BLOOD PRESSURE: 119 MMHG | TEMPERATURE: 99 F

## 2019-12-19 DIAGNOSIS — O13.3 TRANSIENT HYPERTENSION OF PREGNANCY IN THIRD TRIMESTER: ICD-10-CM

## 2019-12-19 DIAGNOSIS — O30.042 DICHORIONIC DIAMNIOTIC TWIN PREGNANCY IN SECOND TRIMESTER: ICD-10-CM

## 2019-12-19 DIAGNOSIS — O12.13 PROTEINURIA AFFECTING PREGNANCY IN THIRD TRIMESTER: ICD-10-CM

## 2019-12-19 DIAGNOSIS — O26.893 PELVIC PAIN IN PREGNANCY, ANTEPARTUM, THIRD TRIMESTER: Primary | ICD-10-CM

## 2019-12-19 DIAGNOSIS — O09.33 LIMITED PRENATAL CARE IN THIRD TRIMESTER: ICD-10-CM

## 2019-12-19 DIAGNOSIS — O99.013 ANEMIA IN PREGNANCY, THIRD TRIMESTER: ICD-10-CM

## 2019-12-19 DIAGNOSIS — O30.043 DICHORIONIC DIAMNIOTIC TWIN PREGNANCY IN THIRD TRIMESTER: ICD-10-CM

## 2019-12-19 DIAGNOSIS — E87.6 HYPOKALEMIA DUE TO INADEQUATE POTASSIUM INTAKE: ICD-10-CM

## 2019-12-19 DIAGNOSIS — R10.2 PELVIC PAIN IN PREGNANCY, ANTEPARTUM, THIRD TRIMESTER: Primary | ICD-10-CM

## 2019-12-19 LAB
ALBUMIN SERPL BCP-MCNC: 2.4 G/DL (ref 3.5–5.2)
ALP SERPL-CCNC: 173 U/L (ref 55–135)
ALT SERPL W/O P-5'-P-CCNC: 10 U/L (ref 10–44)
ANION GAP SERPL CALC-SCNC: 12 MMOL/L (ref 8–16)
AST SERPL-CCNC: 14 U/L (ref 10–40)
BACTERIA #/AREA URNS HPF: ABNORMAL /HPF
BASOPHILS # BLD AUTO: 0.03 K/UL (ref 0–0.2)
BASOPHILS NFR BLD: 0.2 % (ref 0–1.9)
BILIRUB SERPL-MCNC: 0.8 MG/DL (ref 0.1–1)
BILIRUB UR QL STRIP: ABNORMAL
BUN SERPL-MCNC: 4 MG/DL (ref 6–20)
CALCIUM SERPL-MCNC: 8.4 MG/DL (ref 8.7–10.5)
CHLORIDE SERPL-SCNC: 105 MMOL/L (ref 95–110)
CLARITY UR: ABNORMAL
CO2 SERPL-SCNC: 19 MMOL/L (ref 23–29)
COLOR UR: ABNORMAL
CREAT SERPL-MCNC: 0.7 MG/DL (ref 0.5–1.4)
CREAT UR-MCNC: 187.9 MG/DL (ref 15–325)
DIFFERENTIAL METHOD: ABNORMAL
EOSINOPHIL # BLD AUTO: 0 K/UL (ref 0–0.5)
EOSINOPHIL NFR BLD: 0 % (ref 0–8)
ERYTHROCYTE [DISTWIDTH] IN BLOOD BY AUTOMATED COUNT: 13.8 % (ref 11.5–14.5)
EST. GFR  (AFRICAN AMERICAN): >60 ML/MIN/1.73 M^2
EST. GFR  (NON AFRICAN AMERICAN): >60 ML/MIN/1.73 M^2
GLUCOSE SERPL-MCNC: 99 MG/DL (ref 70–110)
GLUCOSE UR QL STRIP: NEGATIVE
HCT VFR BLD AUTO: 27.5 % (ref 37–48.5)
HGB BLD-MCNC: 8.5 G/DL (ref 12–16)
HGB UR QL STRIP: ABNORMAL
HYALINE CASTS #/AREA URNS LPF: 1 /LPF
IMM GRANULOCYTES # BLD AUTO: 0.14 K/UL (ref 0–0.04)
IMM GRANULOCYTES NFR BLD AUTO: 0.8 % (ref 0–0.5)
KETONES UR QL STRIP: ABNORMAL
LEUKOCYTE ESTERASE UR QL STRIP: ABNORMAL
LYMPHOCYTES # BLD AUTO: 1.6 K/UL (ref 1–4.8)
LYMPHOCYTES NFR BLD: 9.7 % (ref 18–48)
MCH RBC QN AUTO: 26.5 PG (ref 27–31)
MCHC RBC AUTO-ENTMCNC: 30.9 G/DL (ref 32–36)
MCV RBC AUTO: 86 FL (ref 82–98)
MICROSCOPIC COMMENT: ABNORMAL
MONOCYTES # BLD AUTO: 1.5 K/UL (ref 0.3–1)
MONOCYTES NFR BLD: 9.1 % (ref 4–15)
NEUTROPHILS # BLD AUTO: 13.4 K/UL (ref 1.8–7.7)
NEUTROPHILS NFR BLD: 80.2 % (ref 38–73)
NITRITE UR QL STRIP: NEGATIVE
NRBC BLD-RTO: 0 /100 WBC
PH UR STRIP: 6 [PH] (ref 5–8)
PLATELET # BLD AUTO: 352 K/UL (ref 150–350)
PMV BLD AUTO: 9.8 FL (ref 9.2–12.9)
POTASSIUM SERPL-SCNC: 2.9 MMOL/L (ref 3.5–5.1)
PROT SERPL-MCNC: 6.2 G/DL (ref 6–8.4)
PROT UR QL STRIP: ABNORMAL
PROT UR-MCNC: 294 MG/DL (ref 0–15)
PROT/CREAT UR: 1.56 MG/G{CREAT} (ref 0–0.2)
RBC # BLD AUTO: 3.21 M/UL (ref 4–5.4)
RBC #/AREA URNS HPF: 4 /HPF (ref 0–4)
SODIUM SERPL-SCNC: 136 MMOL/L (ref 136–145)
SP GR UR STRIP: >=1.03 (ref 1–1.03)
SQUAMOUS #/AREA URNS HPF: 10 /HPF
URN SPEC COLLECT METH UR: ABNORMAL
UROBILINOGEN UR STRIP-ACNC: NEGATIVE EU/DL
WBC # BLD AUTO: 16.66 K/UL (ref 3.9–12.7)
WBC #/AREA URNS HPF: 60 /HPF (ref 0–5)
WBC CLUMPS URNS QL MICRO: ABNORMAL

## 2019-12-19 PROCEDURE — 59025 FETAL NON-STRESS TEST: CPT

## 2019-12-19 PROCEDURE — 76819 FETAL BIOPHYS PROFIL W/O NST: CPT | Mod: 26,,, | Performed by: OBSTETRICS & GYNECOLOGY

## 2019-12-19 PROCEDURE — 83036 HEMOGLOBIN GLYCOSYLATED A1C: CPT

## 2019-12-19 PROCEDURE — 99284 EMERGENCY DEPT VISIT MOD MDM: CPT | Mod: 25

## 2019-12-19 PROCEDURE — 82570 ASSAY OF URINE CREATININE: CPT

## 2019-12-19 PROCEDURE — 80053 COMPREHEN METABOLIC PANEL: CPT

## 2019-12-19 PROCEDURE — 81000 URINALYSIS NONAUTO W/SCOPE: CPT

## 2019-12-19 PROCEDURE — 87491 CHLMYD TRACH DNA AMP PROBE: CPT

## 2019-12-19 PROCEDURE — 86592 SYPHILIS TEST NON-TREP QUAL: CPT

## 2019-12-19 PROCEDURE — 99284 EMERGENCY DEPT VISIT MOD MDM: CPT | Mod: 25,,, | Performed by: OBSTETRICS & GYNECOLOGY

## 2019-12-19 PROCEDURE — 59025 PR FETAL 2N-STRESS TEST: ICD-10-PCS | Mod: 26,22,, | Performed by: OBSTETRICS & GYNECOLOGY

## 2019-12-19 PROCEDURE — 76819 FETAL BIOPHYS PROFIL W/O NST: CPT

## 2019-12-19 PROCEDURE — 25000003 PHARM REV CODE 250: Performed by: OBSTETRICS & GYNECOLOGY

## 2019-12-19 PROCEDURE — 87086 URINE CULTURE/COLONY COUNT: CPT

## 2019-12-19 PROCEDURE — 87077 CULTURE AEROBIC IDENTIFY: CPT

## 2019-12-19 PROCEDURE — 87088 URINE BACTERIA CULTURE: CPT

## 2019-12-19 PROCEDURE — 85025 COMPLETE CBC W/AUTO DIFF WBC: CPT

## 2019-12-19 PROCEDURE — 59025 FETAL NON-STRESS TEST: CPT | Mod: 26,22,, | Performed by: OBSTETRICS & GYNECOLOGY

## 2019-12-19 PROCEDURE — 87186 SC STD MICRODIL/AGAR DIL: CPT | Mod: 59

## 2019-12-19 PROCEDURE — 99284 PR EMERGENCY DEPT VISIT,LEVEL IV: ICD-10-PCS | Mod: 25,,, | Performed by: OBSTETRICS & GYNECOLOGY

## 2019-12-19 PROCEDURE — 76819 PR US, OB, FETAL BIOPHYSICAL, W/O NST: ICD-10-PCS | Mod: 26,,, | Performed by: OBSTETRICS & GYNECOLOGY

## 2019-12-19 PROCEDURE — 86703 HIV-1/HIV-2 1 RESULT ANTBDY: CPT

## 2019-12-19 RX ORDER — POTASSIUM CHLORIDE 750 MG/1
10 TABLET, EXTENDED RELEASE ORAL DAILY
Qty: 4 TABLET | Refills: 0 | Status: SHIPPED | OUTPATIENT
Start: 2019-12-19 | End: 2019-12-19 | Stop reason: SDUPTHER

## 2019-12-19 RX ORDER — POTASSIUM CHLORIDE 20 MEQ/1
40 TABLET, EXTENDED RELEASE ORAL ONCE
Status: COMPLETED | OUTPATIENT
Start: 2019-12-19 | End: 2019-12-19

## 2019-12-19 RX ORDER — FERROUS SULFATE 325(65) MG
325 TABLET ORAL DAILY
Qty: 30 TABLET | Refills: 0 | Status: SHIPPED | OUTPATIENT
Start: 2019-12-19 | End: 2019-12-19 | Stop reason: SDUPTHER

## 2019-12-19 RX ORDER — FERROUS SULFATE 325(65) MG
325 TABLET ORAL DAILY
Qty: 30 TABLET | Refills: 0 | Status: SHIPPED | OUTPATIENT
Start: 2019-12-19

## 2019-12-19 RX ORDER — POTASSIUM CHLORIDE 750 MG/1
10 TABLET, EXTENDED RELEASE ORAL DAILY
Qty: 4 TABLET | Refills: 0 | Status: SHIPPED | OUTPATIENT
Start: 2019-12-19 | End: 2019-12-23

## 2019-12-19 RX ADMIN — POTASSIUM CHLORIDE 40 MEQ: 1500 TABLET, EXTENDED RELEASE ORAL at 01:12

## 2019-12-19 NOTE — DISCHARGE INSTRUCTIONS
Call clinic [phone] or L & D after hours at [phone] for vaginal bleeding, leakage of fluids, contractions 4-5 in one hour, decreased fetal movements ( 10 kicks in 2 hours), headache not relieved by Tylenol, blurry vision, or temp of 100.4 or greater.  Begin doing fetal kick counts, at least 10 movements in 2 hours starting at 28 weeks gestation.  Keep next clinic appointment

## 2019-12-19 NOTE — ED NOTES
Miguel Angel Moreno was seen in the OB ED on 12/19/2019 for pregnancy related complaints at [facility]

## 2019-12-19 NOTE — ED PROVIDER NOTES
Encounter Date: 2019       History     Chief Complaint   Patient presents with    Back Pain    Pelvic Pain     Miguel Angel Moreno is a 24 y.o.  at 33w2d presents for back pain and pelvic pressure. She notes that it all started last night and has been constant. The back pain is across her lower back. She denies fever, chills, chest pain, nausea, vomiting, constipation, diarrhea, and dysuria. No contractions, vaginal bleeding, leakage of fluid. Reports good fetal movement. This IUP is complicated by di-di twins. She reports that she has had intercourse in the last 24 hours.         Review of patient's allergies indicates:   Allergen Reactions    Tree nut Hives     PEANUTS!!!     No past medical history on file.  No past surgical history on file.  Family History   Problem Relation Age of Onset    Hypertension Maternal Grandmother     Diabetes Maternal Grandmother     Breast cancer Neg Hx     Colon cancer Neg Hx     Ovarian cancer Neg Hx      Social History     Tobacco Use    Smoking status: Never Smoker   Substance Use Topics    Alcohol use: No    Drug use: No     Review of Systems   Constitutional: Negative for activity change, appetite change, chills and fever.   Eyes: Negative for photophobia and visual disturbance.   Respiratory: Negative for chest tightness and shortness of breath.    Cardiovascular: Negative for chest pain.   Gastrointestinal: Negative for abdominal pain, constipation, diarrhea, nausea and vomiting.   Genitourinary: Positive for pelvic pain. Negative for dysuria, vaginal bleeding and vaginal discharge.   Musculoskeletal: Positive for back pain.   Neurological: Negative for dizziness, light-headedness and headaches.   Psychiatric/Behavioral: Negative for dysphoric mood, self-injury and suicidal ideas.       Physical Exam     Initial Vitals   BP Pulse Resp Temp SpO2   19 1059 19 1059 19 1055 19 1100 19 1101   112/89 (!) 118 18 98.6 °F (37 °C) 99 %       MAP       --                Physical Exam    Vitals reviewed.  Constitutional: She appears well-developed and well-nourished. She is not diaphoretic. No distress.   HENT:   Head: Normocephalic and atraumatic.   Nose: Nose normal.   Eyes: Conjunctivae are normal.   Neck: Normal range of motion.   Cardiovascular: Normal rate and intact distal pulses.   Pulmonary/Chest: No respiratory distress.   Abdominal: Soft. She exhibits no distension. There is no tenderness. There is no CVA tenderness.   Gravid; tenderness over pubic symphysis and in right hip   Musculoskeletal: She exhibits no edema or tenderness.   Neurological: She is alert and oriented to person, place, and time. She has normal reflexes.   Skin: Skin is warm and dry. Capillary refill takes less than 2 seconds.   Psychiatric: She has a normal mood and affect. Her behavior is normal. Judgment and thought content normal.     OB LABOR EXAM:   Pre-Term Labor: No.     Method: Sterile vaginal exam per MD.   Vaginal Bleeding: none present.     Dilatation: 0.       Amniotic Fluid Color: no fluid.   Amniotic Fluid Amount: none noted.   Comments: Closed/thick/high       ED Course   Obtain Fetal nonstress test (NST)  Date/Time: 12/19/2019 11:19 AM  Performed by: Yuko Dela Cruz MD  Authorized by: Yuko Dela Cruz MD     Nonstress Test:     Contractions:  Irregular    Contraction Frequency:  Q 3-5 minutes  Biophysical Profile:     Nonstress Test Interpretation: reactive      Overall Impression:  Reassuring  Post-procedure:      A: 145 bpm, moderate variability, +accels, no decels. Category 1 (reassuring)  B: 150 bpm, moderate variability, +accels, no decels. Category 1 (reassuring)       Labs Reviewed   PROTEIN / CREATININE RATIO, URINE - Abnormal; Notable for the following components:       Result Value    Protein, Urine Random 294 (*)     Prot/Creat Ratio, Ur 1.56 (*)     All other components within normal limits   CBC W/ AUTO DIFFERENTIAL - Abnormal;  Notable for the following components:    WBC 16.66 (*)     RBC 3.21 (*)     Hemoglobin 8.5 (*)     Hematocrit 27.5 (*)     Mean Corpuscular Hemoglobin 26.5 (*)     Mean Corpuscular Hemoglobin Conc 30.9 (*)     Platelets 352 (*)     Immature Granulocytes 0.8 (*)     Gran # (ANC) 13.4 (*)     Immature Grans (Abs) 0.14 (*)     Mono # 1.5 (*)     Gran% 80.2 (*)     Lymph% 9.7 (*)     All other components within normal limits   COMPREHENSIVE METABOLIC PANEL - Abnormal; Notable for the following components:    Potassium 2.9 (*)     CO2 19 (*)     BUN, Bld 4 (*)     Calcium 8.4 (*)     Albumin 2.4 (*)     Alkaline Phosphatase 173 (*)     All other components within normal limits   URINALYSIS, REFLEX TO URINE CULTURE - Abnormal; Notable for the following components:    Appearance, UA Cloudy (*)     Specific Gravity, UA >=1.030 (*)     Protein, UA 2+ (*)     Ketones, UA 3+ (*)     Bilirubin (UA) 1+ (*)     Occult Blood UA 2+ (*)     Leukocytes, UA 2+ (*)     All other components within normal limits    Narrative:     Preferred Collection Type->Urine, Clean Catch   URINALYSIS MICROSCOPIC - Abnormal; Notable for the following components:    WBC, UA 60 (*)     WBC Clumps, UA Few (*)     Bacteria Many (*)     All other components within normal limits    Narrative:     Preferred Collection Type->Urine, Clean Catch   C. TRACHOMATIS/N. GONORRHOEAE BY AMP DNA   CULTURE, URINE   HEMOGLOBIN A1C   RPR   HIV 1 / 2 ANTIBODY          Imaging Results    None          Medical Decision Making:   ED Management:  VSS. NST reactive and reassuring x2. Contractions q 3-5 minutes on toco. Cervix closed on SVE. Unable to send FFN because of intercourse in the last 24 hours. Will PO hydrate and continue to monitor.    Contractions spaced with PO hydration. One BP with elevated diastolic at 92. PreE labs drawn. Patient remains asymptomatic.    CBC, CMP overall within normal limits with exception of K+ 2.9. Will replace. P/C ratio also elevated at  1.56. All other BP within normal limits. Will have patient come to clinic tomorrow for BP check. Repeat labs Monday. PreE precautions given.               Attending Attestation:   Physician Attestation Statement for Resident:  As the supervising MD   Physician Attestation Statement: I have personally seen and examined this patient.   I agree with the above history. -:   As the supervising MD I agree with the above PE.    As the supervising MD I agree with the above treatment, course, plan, and disposition.  I was personally present during the critical portions of the procedure(s) performed by the resident and was immediately available in the ED to provide services and assistance as needed during the entire procedure.  I have reviewed and agree with the residents interpretation of the following: lab data.  I have reviewed the following: old records at this facility.                                  Clinical Impression:       ICD-10-CM ICD-9-CM   1. Pelvic pain in pregnancy, antepartum, third trimester O26.893 646.83    R10.2 625.9   2. Dichorionic diamniotic twin pregnancy in third trimester O30.043 651.03     V91.03   3. Limited prenatal care in third trimester O09.33 V23.7   4. Transient hypertension of pregnancy in third trimester O13.3 642.33   5. Hypokalemia due to inadequate potassium intake E87.6 276.8   6. Anemia in pregnancy, third trimester O99.013 648.23         Disposition:   Disposition: Discharged  Condition: Stable                     Yuko Dela Cruz MD  Resident  12/19/19 1352       Tamiko Snow MD  12/19/19 1082

## 2019-12-19 NOTE — ED NOTES
Pt pregnant with twins c/o back pain and pelvic pain..   +fm X2   Denies vb or lof   Dr Dela Cruz notified

## 2019-12-20 LAB
C TRACH DNA SPEC QL NAA+PROBE: NOT DETECTED
ESTIMATED AVG GLUCOSE: 97 MG/DL (ref 68–131)
HBA1C MFR BLD HPLC: 5 % (ref 4–5.6)
HIV 1+2 AB+HIV1 P24 AG SERPL QL IA: NEGATIVE
N GONORRHOEA DNA SPEC QL NAA+PROBE: NOT DETECTED
RPR SER QL: NORMAL

## 2019-12-22 LAB — BACTERIA UR CULT: ABNORMAL

## 2019-12-23 ENCOUNTER — TELEPHONE (OUTPATIENT)
Dept: OBSTETRICS AND GYNECOLOGY | Facility: CLINIC | Age: 24
End: 2019-12-23

## 2019-12-23 DIAGNOSIS — O23.43 URINARY TRACT INFECTION IN MOTHER DURING THIRD TRIMESTER OF PREGNANCY: Primary | ICD-10-CM

## 2019-12-23 RX ORDER — NITROFURANTOIN 25; 75 MG/1; MG/1
100 CAPSULE ORAL 2 TIMES DAILY
Qty: 14 CAPSULE | Refills: 0 | Status: ON HOLD | OUTPATIENT
Start: 2019-12-23 | End: 2019-12-29 | Stop reason: HOSPADM

## 2019-12-23 NOTE — TELEPHONE ENCOUNTER
----- Message from Irlanda Castillo LPN sent at 12/23/2019  9:16 AM CST -----      ----- Message -----  From: Tamiko Snow MD  Sent: 12/23/2019   6:58 AM CST  To: Misael COLUNGA Staff    Positive urine culture in pregnancy: please treat

## 2019-12-23 NOTE — TELEPHONE ENCOUNTER
Notified pt that urine culture was positive for UTI and Rx was sent to the pharmacy. Pt voiced understanding.

## 2019-12-26 ENCOUNTER — ANESTHESIA (OUTPATIENT)
Dept: OBSTETRICS AND GYNECOLOGY | Facility: OTHER | Age: 24
End: 2019-12-26
Payer: MEDICAID

## 2019-12-26 ENCOUNTER — HOSPITAL ENCOUNTER (INPATIENT)
Facility: OTHER | Age: 24
LOS: 4 days | Discharge: HOME | End: 2019-12-30
Attending: OBSTETRICS & GYNECOLOGY | Admitting: OBSTETRICS & GYNECOLOGY
Payer: MEDICAID

## 2019-12-26 ENCOUNTER — ANESTHESIA EVENT (OUTPATIENT)
Dept: OBSTETRICS AND GYNECOLOGY | Facility: OTHER | Age: 24
End: 2019-12-26
Payer: MEDICAID

## 2019-12-26 DIAGNOSIS — O09.93 SUPERVISION OF HIGH RISK PREGNANCY IN THIRD TRIMESTER: ICD-10-CM

## 2019-12-26 DIAGNOSIS — O30.043 TWIN PREGNANCY, DICHORIONIC/DIAMNIOTIC, THIRD TRIMESTER: ICD-10-CM

## 2019-12-26 PROBLEM — O09.90 PREGNANCY, SUPERVISION, HIGH-RISK: Status: ACTIVE | Noted: 2019-12-26

## 2019-12-26 PROBLEM — O09.90 PREGNANCY, SUPERVISION, HIGH-RISK: Status: RESOLVED | Noted: 2019-08-06 | Resolved: 2019-12-26

## 2019-12-26 PROBLEM — Z98.891 S/P EMERGENCY CESAREAN SECTION: Status: ACTIVE | Noted: 2019-12-26

## 2019-12-26 LAB
ABO + RH BLD: NORMAL
ALBUMIN SERPL BCP-MCNC: 1.6 G/DL (ref 3.5–5.2)
ALLENS TEST: ABNORMAL
ALLENS TEST: ABNORMAL
ALP SERPL-CCNC: 204 U/L (ref 55–135)
ALT SERPL W/O P-5'-P-CCNC: 6 U/L (ref 10–44)
ANION GAP SERPL CALC-SCNC: 11 MMOL/L (ref 8–16)
ANISOCYTOSIS BLD QL SMEAR: SLIGHT
AST SERPL-CCNC: 15 U/L (ref 10–40)
BASOPHILS NFR BLD: 0 % (ref 0–1.9)
BILIRUB SERPL-MCNC: 0.6 MG/DL (ref 0.1–1)
BLD GP AB SCN CELLS X3 SERPL QL: NORMAL
BUN SERPL-MCNC: 18 MG/DL (ref 6–20)
CALCIUM SERPL-MCNC: 8.3 MG/DL (ref 8.7–10.5)
CHLORIDE SERPL-SCNC: 103 MMOL/L (ref 95–110)
CO2 SERPL-SCNC: 20 MMOL/L (ref 23–29)
CREAT SERPL-MCNC: 1.7 MG/DL (ref 0.5–1.4)
CREAT UR-MCNC: 194.7 MG/DL (ref 15–325)
DELSYS: ABNORMAL
DELSYS: ABNORMAL
DIFFERENTIAL METHOD: ABNORMAL
EOSINOPHIL NFR BLD: 2.5 % (ref 0–8)
ERYTHROCYTE [DISTWIDTH] IN BLOOD BY AUTOMATED COUNT: 14.4 % (ref 11.5–14.5)
EST. GFR  (AFRICAN AMERICAN): 48 ML/MIN/1.73 M^2
EST. GFR  (NON AFRICAN AMERICAN): 42 ML/MIN/1.73 M^2
GLUCOSE SERPL-MCNC: 108 MG/DL (ref 70–110)
HCO3 UR-SCNC: 22.1 MMOL/L (ref 24–28)
HCO3 UR-SCNC: 23.1 MMOL/L (ref 24–28)
HCT VFR BLD AUTO: 31.7 % (ref 37–48.5)
HGB BLD-MCNC: 10 G/DL (ref 12–16)
IMM GRANULOCYTES # BLD AUTO: ABNORMAL K/UL (ref 0–0.04)
IMM GRANULOCYTES NFR BLD AUTO: ABNORMAL % (ref 0–0.5)
LYMPHOCYTES NFR BLD: 8.9 % (ref 18–48)
MCH RBC QN AUTO: 26 PG (ref 27–31)
MCHC RBC AUTO-ENTMCNC: 31.5 G/DL (ref 32–36)
MCV RBC AUTO: 82 FL (ref 82–98)
MODE: ABNORMAL
MODE: ABNORMAL
MONOCYTES NFR BLD: 2.5 % (ref 4–15)
NEUTROPHILS NFR BLD: 81 % (ref 38–73)
NEUTS BAND NFR BLD MANUAL: 5.1 %
NRBC BLD-RTO: 1 /100 WBC
OVALOCYTES BLD QL SMEAR: ABNORMAL
PCO2 BLDA: 46.2 MMHG (ref 35–45)
PCO2 BLDA: 52.7 MMHG (ref 35–45)
PH SMN: 7.25 [PH] (ref 7.35–7.45)
PH SMN: 7.29 [PH] (ref 7.35–7.45)
PLATELET # BLD AUTO: 249 K/UL (ref 150–350)
PLATELET BLD QL SMEAR: ABNORMAL
PMV BLD AUTO: 10.4 FL (ref 9.2–12.9)
PO2 BLDA: 14 MMHG (ref 80–100)
PO2 BLDA: 9 MMHG (ref 80–100)
POC BE: -4 MMOL/L
POC BE: -4 MMOL/L
POC SATURATED O2: 15 % (ref 95–100)
POC SATURATED O2: 6 % (ref 95–100)
POIKILOCYTOSIS BLD QL SMEAR: SLIGHT
POTASSIUM SERPL-SCNC: 3.4 MMOL/L (ref 3.5–5.1)
PROT SERPL-MCNC: 6.1 G/DL (ref 6–8.4)
PROT UR-MCNC: 151 MG/DL (ref 0–15)
PROT/CREAT UR: 0.78 MG/G{CREAT} (ref 0–0.2)
RBC # BLD AUTO: 3.85 M/UL (ref 4–5.4)
SAMPLE: ABNORMAL
SAMPLE: ABNORMAL
SITE: ABNORMAL
SITE: ABNORMAL
SODIUM SERPL-SCNC: 134 MMOL/L (ref 136–145)
WBC # BLD AUTO: 20.25 K/UL (ref 3.9–12.7)

## 2019-12-26 PROCEDURE — 51702 INSERT TEMP BLADDER CATH: CPT

## 2019-12-26 PROCEDURE — 59514 PR CESAREAN DELIVERY ONLY: ICD-10-PCS | Mod: 22,AT,, | Performed by: OBSTETRICS & GYNECOLOGY

## 2019-12-26 PROCEDURE — 37000009 HC ANESTHESIA EA ADD 15 MINS: Performed by: OBSTETRICS & GYNECOLOGY

## 2019-12-26 PROCEDURE — 59514 CESAREAN DELIVERY ONLY: CPT | Mod: 22,AT,, | Performed by: OBSTETRICS & GYNECOLOGY

## 2019-12-26 PROCEDURE — 85007 BL SMEAR W/DIFF WBC COUNT: CPT

## 2019-12-26 PROCEDURE — 25000003 PHARM REV CODE 250: Performed by: STUDENT IN AN ORGANIZED HEALTH CARE EDUCATION/TRAINING PROGRAM

## 2019-12-26 PROCEDURE — 71000039 HC RECOVERY, EACH ADD'L HOUR: Performed by: OBSTETRICS & GYNECOLOGY

## 2019-12-26 PROCEDURE — 59025 FETAL NON-STRESS TEST: CPT

## 2019-12-26 PROCEDURE — 82570 ASSAY OF URINE CREATININE: CPT

## 2019-12-26 PROCEDURE — 59514 CESAREAN DELIVERY ONLY: CPT | Mod: ,,, | Performed by: ANESTHESIOLOGY

## 2019-12-26 PROCEDURE — 99900035 HC TECH TIME PER 15 MIN (STAT)

## 2019-12-26 PROCEDURE — 85027 COMPLETE CBC AUTOMATED: CPT

## 2019-12-26 PROCEDURE — 63600175 PHARM REV CODE 636 W HCPCS: Performed by: STUDENT IN AN ORGANIZED HEALTH CARE EDUCATION/TRAINING PROGRAM

## 2019-12-26 PROCEDURE — 82803 BLOOD GASES ANY COMBINATION: CPT

## 2019-12-26 PROCEDURE — 88307 PR  SURG PATH,LEVEL V: ICD-10-PCS | Mod: 26,,, | Performed by: PATHOLOGY

## 2019-12-26 PROCEDURE — 99285 EMERGENCY DEPT VISIT HI MDM: CPT | Mod: 25

## 2019-12-26 PROCEDURE — 86901 BLOOD TYPING SEROLOGIC RH(D): CPT

## 2019-12-26 PROCEDURE — 36415 COLL VENOUS BLD VENIPUNCTURE: CPT

## 2019-12-26 PROCEDURE — 80053 COMPREHEN METABOLIC PANEL: CPT

## 2019-12-26 PROCEDURE — 36416 COLLJ CAPILLARY BLOOD SPEC: CPT

## 2019-12-26 PROCEDURE — 37000008 HC ANESTHESIA 1ST 15 MINUTES: Performed by: OBSTETRICS & GYNECOLOGY

## 2019-12-26 PROCEDURE — 36004724 HC OB OR TIME LEV III - 1ST 15 MIN: Performed by: OBSTETRICS & GYNECOLOGY

## 2019-12-26 PROCEDURE — 27200688 HC TRAY, SPINAL-HYPER/ ISOBARIC: Performed by: ANESTHESIOLOGY

## 2019-12-26 PROCEDURE — 11000001 HC ACUTE MED/SURG PRIVATE ROOM

## 2019-12-26 PROCEDURE — 63600175 PHARM REV CODE 636 W HCPCS: Performed by: OBSTETRICS & GYNECOLOGY

## 2019-12-26 PROCEDURE — 36004725 HC OB OR TIME LEV III - EA ADD 15 MIN: Performed by: OBSTETRICS & GYNECOLOGY

## 2019-12-26 PROCEDURE — 80053 COMPREHEN METABOLIC PANEL: CPT | Mod: 91

## 2019-12-26 PROCEDURE — 88307 TISSUE EXAM BY PATHOLOGIST: CPT | Performed by: PATHOLOGY

## 2019-12-26 PROCEDURE — 88307 TISSUE EXAM BY PATHOLOGIST: CPT | Mod: 26,,, | Performed by: PATHOLOGY

## 2019-12-26 PROCEDURE — 0502F PR SUBSEQUENT PRENATAL CARE: ICD-10-PCS | Mod: ,,, | Performed by: OBSTETRICS & GYNECOLOGY

## 2019-12-26 PROCEDURE — 83735 ASSAY OF MAGNESIUM: CPT

## 2019-12-26 PROCEDURE — 59514 PRA REAN DELIVERY ONLY: ICD-10-PCS | Mod: ,,, | Performed by: ANESTHESIOLOGY

## 2019-12-26 PROCEDURE — S0020 INJECTION, BUPIVICAINE HYDRO: HCPCS | Performed by: STUDENT IN AN ORGANIZED HEALTH CARE EDUCATION/TRAINING PROGRAM

## 2019-12-26 PROCEDURE — 0502F SUBSEQUENT PRENATAL CARE: CPT | Mod: ,,, | Performed by: OBSTETRICS & GYNECOLOGY

## 2019-12-26 PROCEDURE — 71000033 HC RECOVERY, INTIAL HOUR: Performed by: OBSTETRICS & GYNECOLOGY

## 2019-12-26 RX ORDER — ACETAMINOPHEN 10 MG/ML
INJECTION, SOLUTION INTRAVENOUS
Status: DISCONTINUED | OUTPATIENT
Start: 2019-12-26 | End: 2019-12-26

## 2019-12-26 RX ORDER — FAMOTIDINE 10 MG/ML
20 INJECTION INTRAVENOUS
Status: DISCONTINUED | OUTPATIENT
Start: 2019-12-26 | End: 2019-12-26

## 2019-12-26 RX ORDER — OXYTOCIN/RINGER'S LACTATE 30/500 ML
334 PLASTIC BAG, INJECTION (ML) INTRAVENOUS ONCE
Status: DISCONTINUED | OUTPATIENT
Start: 2019-12-26 | End: 2019-12-26

## 2019-12-26 RX ORDER — KETOROLAC TROMETHAMINE 30 MG/ML
30 INJECTION, SOLUTION INTRAMUSCULAR; INTRAVENOUS EVERY 6 HOURS
Status: COMPLETED | OUTPATIENT
Start: 2019-12-26 | End: 2019-12-27

## 2019-12-26 RX ORDER — NITROFURANTOIN 25; 75 MG/1; MG/1
100 CAPSULE ORAL EVERY 12 HOURS
Status: DISCONTINUED | OUTPATIENT
Start: 2019-12-26 | End: 2019-12-27

## 2019-12-26 RX ORDER — OXYCODONE HYDROCHLORIDE 5 MG/1
5 TABLET ORAL EVERY 4 HOURS PRN
Status: ACTIVE | OUTPATIENT
Start: 2019-12-26 | End: 2019-12-27

## 2019-12-26 RX ORDER — HYDROCORTISONE 25 MG/G
CREAM TOPICAL 3 TIMES DAILY PRN
Status: DISCONTINUED | OUTPATIENT
Start: 2019-12-26 | End: 2019-12-30 | Stop reason: HOSPADM

## 2019-12-26 RX ORDER — CALCIUM GLUCONATE 98 MG/ML
1 INJECTION, SOLUTION INTRAVENOUS
Status: DISCONTINUED | OUTPATIENT
Start: 2019-12-26 | End: 2019-12-30 | Stop reason: HOSPADM

## 2019-12-26 RX ORDER — AMOXICILLIN 250 MG
1 CAPSULE ORAL NIGHTLY PRN
Status: DISCONTINUED | OUTPATIENT
Start: 2019-12-26 | End: 2019-12-30 | Stop reason: HOSPADM

## 2019-12-26 RX ORDER — ADHESIVE BANDAGE
30 BANDAGE TOPICAL 2 TIMES DAILY PRN
Status: DISCONTINUED | OUTPATIENT
Start: 2019-12-27 | End: 2019-12-30 | Stop reason: HOSPADM

## 2019-12-26 RX ORDER — SODIUM CHLORIDE, SODIUM LACTATE, POTASSIUM CHLORIDE, CALCIUM CHLORIDE 600; 310; 30; 20 MG/100ML; MG/100ML; MG/100ML; MG/100ML
INJECTION, SOLUTION INTRAVENOUS CONTINUOUS
Status: DISCONTINUED | OUTPATIENT
Start: 2019-12-26 | End: 2019-12-26

## 2019-12-26 RX ORDER — DOCUSATE SODIUM 100 MG/1
200 CAPSULE, LIQUID FILLED ORAL 2 TIMES DAILY
Status: DISCONTINUED | OUTPATIENT
Start: 2019-12-26 | End: 2019-12-30 | Stop reason: HOSPADM

## 2019-12-26 RX ORDER — CARBOPROST TROMETHAMINE 250 UG/ML
250 INJECTION, SOLUTION INTRAMUSCULAR
Status: DISCONTINUED | OUTPATIENT
Start: 2019-12-26 | End: 2019-12-26

## 2019-12-26 RX ORDER — OXYTOCIN/RINGER'S LACTATE 30/500 ML
95 PLASTIC BAG, INJECTION (ML) INTRAVENOUS ONCE
Status: DISCONTINUED | OUTPATIENT
Start: 2019-12-26 | End: 2019-12-26

## 2019-12-26 RX ORDER — SIMETHICONE 80 MG
1 TABLET,CHEWABLE ORAL EVERY 6 HOURS PRN
Status: DISCONTINUED | OUTPATIENT
Start: 2019-12-26 | End: 2019-12-30 | Stop reason: HOSPADM

## 2019-12-26 RX ORDER — OXYCODONE AND ACETAMINOPHEN 5; 325 MG/1; MG/1
1 TABLET ORAL EVERY 4 HOURS PRN
Status: DISCONTINUED | OUTPATIENT
Start: 2019-12-26 | End: 2019-12-28

## 2019-12-26 RX ORDER — MUPIROCIN 20 MG/G
1 OINTMENT TOPICAL 2 TIMES DAILY
Status: DISCONTINUED | OUTPATIENT
Start: 2019-12-26 | End: 2019-12-30 | Stop reason: HOSPADM

## 2019-12-26 RX ORDER — IBUPROFEN 400 MG/1
800 TABLET ORAL EVERY 8 HOURS
Status: DISCONTINUED | OUTPATIENT
Start: 2019-12-27 | End: 2019-12-28

## 2019-12-26 RX ORDER — DEXAMETHASONE SODIUM PHOSPHATE 4 MG/ML
INJECTION, SOLUTION INTRA-ARTICULAR; INTRALESIONAL; INTRAMUSCULAR; INTRAVENOUS; SOFT TISSUE
Status: DISCONTINUED | OUTPATIENT
Start: 2019-12-26 | End: 2019-12-26

## 2019-12-26 RX ORDER — ONDANSETRON 8 MG/1
8 TABLET, ORALLY DISINTEGRATING ORAL EVERY 8 HOURS PRN
Status: DISCONTINUED | OUTPATIENT
Start: 2019-12-26 | End: 2019-12-30 | Stop reason: HOSPADM

## 2019-12-26 RX ORDER — MORPHINE SULFATE 0.5 MG/ML
INJECTION, SOLUTION EPIDURAL; INTRATHECAL; INTRAVENOUS
Status: DISCONTINUED | OUTPATIENT
Start: 2019-12-26 | End: 2019-12-26

## 2019-12-26 RX ORDER — MISOPROSTOL 200 UG/1
800 TABLET ORAL
Status: DISCONTINUED | OUTPATIENT
Start: 2019-12-26 | End: 2019-12-26

## 2019-12-26 RX ORDER — KETOROLAC TROMETHAMINE 30 MG/ML
30 INJECTION, SOLUTION INTRAMUSCULAR; INTRAVENOUS EVERY 6 HOURS
Status: DISCONTINUED | OUTPATIENT
Start: 2019-12-26 | End: 2019-12-26 | Stop reason: SDUPTHER

## 2019-12-26 RX ORDER — MAGNESIUM SULFATE HEPTAHYDRATE 40 MG/ML
4 INJECTION, SOLUTION INTRAVENOUS ONCE
Status: COMPLETED | OUTPATIENT
Start: 2019-12-26 | End: 2019-12-26

## 2019-12-26 RX ORDER — ACETAMINOPHEN 325 MG/1
650 TABLET ORAL EVERY 6 HOURS
Status: COMPLETED | OUTPATIENT
Start: 2019-12-26 | End: 2019-12-27

## 2019-12-26 RX ORDER — DIPHENHYDRAMINE HCL 25 MG
25 CAPSULE ORAL EVERY 4 HOURS PRN
Status: DISCONTINUED | OUTPATIENT
Start: 2019-12-26 | End: 2019-12-30 | Stop reason: HOSPADM

## 2019-12-26 RX ORDER — OXYCODONE AND ACETAMINOPHEN 10; 325 MG/1; MG/1
1 TABLET ORAL EVERY 4 HOURS PRN
Status: DISCONTINUED | OUTPATIENT
Start: 2019-12-26 | End: 2019-12-28

## 2019-12-26 RX ORDER — ONDANSETRON 2 MG/ML
4 INJECTION INTRAMUSCULAR; INTRAVENOUS EVERY 6 HOURS PRN
Status: ACTIVE | OUTPATIENT
Start: 2019-12-26 | End: 2019-12-27

## 2019-12-26 RX ORDER — BUPIVACAINE HYDROCHLORIDE 7.5 MG/ML
INJECTION, SOLUTION EPIDURAL; RETROBULBAR
Status: DISCONTINUED | OUTPATIENT
Start: 2019-12-26 | End: 2019-12-26

## 2019-12-26 RX ORDER — FENTANYL CITRATE 50 UG/ML
INJECTION, SOLUTION INTRAMUSCULAR; INTRAVENOUS
Status: DISCONTINUED | OUTPATIENT
Start: 2019-12-26 | End: 2019-12-26

## 2019-12-26 RX ORDER — OXYTOCIN 10 [USP'U]/ML
INJECTION, SOLUTION INTRAMUSCULAR; INTRAVENOUS
Status: DISCONTINUED | OUTPATIENT
Start: 2019-12-26 | End: 2019-12-26

## 2019-12-26 RX ORDER — SODIUM CITRATE AND CITRIC ACID MONOHYDRATE 334; 500 MG/5ML; MG/5ML
30 SOLUTION ORAL
Status: DISCONTINUED | OUTPATIENT
Start: 2019-12-26 | End: 2019-12-26

## 2019-12-26 RX ORDER — ONDANSETRON HYDROCHLORIDE 2 MG/ML
INJECTION, SOLUTION INTRAMUSCULAR; INTRAVENOUS
Status: DISCONTINUED | OUTPATIENT
Start: 2019-12-26 | End: 2019-12-26

## 2019-12-26 RX ORDER — OXYCODONE HYDROCHLORIDE 5 MG/1
10 TABLET ORAL EVERY 4 HOURS PRN
Status: DISPENSED | OUTPATIENT
Start: 2019-12-26 | End: 2019-12-27

## 2019-12-26 RX ORDER — METHYLERGONOVINE MALEATE 0.2 MG/ML
200 INJECTION INTRAVENOUS
Status: DISCONTINUED | OUTPATIENT
Start: 2019-12-26 | End: 2019-12-26

## 2019-12-26 RX ORDER — OXYTOCIN/RINGER'S LACTATE 30/500 ML
95 PLASTIC BAG, INJECTION (ML) INTRAVENOUS ONCE
Status: COMPLETED | OUTPATIENT
Start: 2019-12-26 | End: 2019-12-26

## 2019-12-26 RX ORDER — SODIUM CHLORIDE, SODIUM LACTATE, POTASSIUM CHLORIDE, CALCIUM CHLORIDE 600; 310; 30; 20 MG/100ML; MG/100ML; MG/100ML; MG/100ML
INJECTION, SOLUTION INTRAVENOUS CONTINUOUS
Status: DISCONTINUED | OUTPATIENT
Start: 2019-12-26 | End: 2019-12-28

## 2019-12-26 RX ORDER — BISACODYL 10 MG
10 SUPPOSITORY, RECTAL RECTAL ONCE AS NEEDED
Status: DISCONTINUED | OUTPATIENT
Start: 2019-12-26 | End: 2019-12-30 | Stop reason: HOSPADM

## 2019-12-26 RX ORDER — MUPIROCIN 20 MG/G
OINTMENT TOPICAL
Status: CANCELLED | OUTPATIENT
Start: 2019-12-26

## 2019-12-26 RX ORDER — FERROUS SULFATE 325(65) MG
325 TABLET, DELAYED RELEASE (ENTERIC COATED) ORAL DAILY
Status: DISCONTINUED | OUTPATIENT
Start: 2019-12-27 | End: 2019-12-30 | Stop reason: HOSPADM

## 2019-12-26 RX ORDER — PRENATAL WITH FERROUS FUM AND FOLIC ACID 3080; 920; 120; 400; 22; 1.84; 3; 20; 10; 1; 12; 200; 27; 25; 2 [IU]/1; [IU]/1; MG/1; [IU]/1; MG/1; MG/1; MG/1; MG/1; MG/1; MG/1; UG/1; MG/1; MG/1; MG/1; MG/1
1 TABLET ORAL DAILY
Status: DISCONTINUED | OUTPATIENT
Start: 2019-12-27 | End: 2019-12-30 | Stop reason: HOSPADM

## 2019-12-26 RX ADMIN — DEXTROSE 2 G: 50 INJECTION, SOLUTION INTRAVENOUS at 03:12

## 2019-12-26 RX ADMIN — PHENYLEPHRINE HYDROCHLORIDE 50 MCG/KG/MIN: 10 INJECTION INTRAVENOUS at 03:12

## 2019-12-26 RX ADMIN — MAGNESIUM SULFATE HEPTAHYDRATE 4 G: 40 INJECTION, SOLUTION INTRAVENOUS at 10:12

## 2019-12-26 RX ADMIN — BUPIVACAINE HYDROCHLORIDE 1.6 ML: 7.5 INJECTION, SOLUTION EPIDURAL; RETROBULBAR at 03:12

## 2019-12-26 RX ADMIN — OXYCODONE HYDROCHLORIDE 10 MG: 5 TABLET ORAL at 06:12

## 2019-12-26 RX ADMIN — ACETAMINOPHEN 1000 MG: 10 INJECTION, SOLUTION INTRAVENOUS at 03:12

## 2019-12-26 RX ADMIN — KETOROLAC TROMETHAMINE 30 MG: 30 INJECTION, SOLUTION INTRAMUSCULAR; INTRAVENOUS at 05:12

## 2019-12-26 RX ADMIN — ONDANSETRON 4 MG: 2 INJECTION, SOLUTION INTRAMUSCULAR; INTRAVENOUS at 03:12

## 2019-12-26 RX ADMIN — OXYTOCIN 10 UNITS: 10 INJECTION, SOLUTION INTRAMUSCULAR; INTRAVENOUS at 03:12

## 2019-12-26 RX ADMIN — Medication 95 MILLI-UNITS/MIN: at 04:12

## 2019-12-26 RX ADMIN — FENTANYL CITRATE 10 MCG: 50 INJECTION, SOLUTION INTRAMUSCULAR; INTRAVENOUS at 03:12

## 2019-12-26 RX ADMIN — NITROFURANTOIN (MONOHYDRATE/MACROCRYSTALS) 100 MG: 75; 25 CAPSULE ORAL at 06:12

## 2019-12-26 RX ADMIN — SODIUM CHLORIDE, SODIUM LACTATE, POTASSIUM CHLORIDE, AND CALCIUM CHLORIDE: .6; .31; .03; .02 INJECTION, SOLUTION INTRAVENOUS at 04:12

## 2019-12-26 RX ADMIN — MUPIROCIN 1 G: 20 OINTMENT TOPICAL at 08:12

## 2019-12-26 RX ADMIN — DEXAMETHASONE SODIUM PHOSPHATE 4 MG: 4 INJECTION, SOLUTION INTRAMUSCULAR; INTRAVENOUS at 03:12

## 2019-12-26 RX ADMIN — DOCUSATE SODIUM 200 MG: 100 CAPSULE, LIQUID FILLED ORAL at 08:12

## 2019-12-26 RX ADMIN — Medication 0.1 MG: at 03:12

## 2019-12-26 RX ADMIN — SODIUM CHLORIDE, SODIUM LACTATE, POTASSIUM CHLORIDE, AND CALCIUM CHLORIDE: .6; .31; .03; .02 INJECTION, SOLUTION INTRAVENOUS at 10:12

## 2019-12-26 NOTE — ANESTHESIA PROCEDURE NOTES
Spinal    Diagnosis: IUP  Patient location during procedure: OR  Start time: 12/26/2019 3:33 PM  Timeout: 12/26/2019 3:32 PM  End time: 12/26/2019 3:35 PM    Staffing  Authorizing Provider: Jannette Noguera MD  Performing Provider: Jannette Noguera MD    Preanesthetic Checklist  Completed: patient identified, surgical consent, pre-op evaluation, timeout performed, IV checked, risks and benefits discussed and monitors and equipment checked  Spinal Block  Patient position: sitting  Prep: ChloraPrep  Patient monitoring: heart rate, continuous pulse ox and frequent blood pressure checks  Approach: midline  Location: L3-4  Injection technique: single shot  CSF Fluid: clear free-flowing CSF  Needle  Needle type: Mason   Needle gauge: 25 G  Needle length: 3.5 in  Additional Documentation: incremental injection and negative aspiration for heme  Needle localization: anatomical landmarks  Assessment  Sensory level: T4   Dermatomal levels determined by pinch or prick  Ease of block: easy  Patient's tolerance of the procedure: comfortable throughout block and no complaints

## 2019-12-26 NOTE — NURSING
Pt states she would like infants to receive formula. Reviewed benefits of breast milk with pt and pumping. Pt verbalizes understanding and states she would still like to formula feed. Pt encouraged to let RN know if she changes her mind at any point.

## 2019-12-26 NOTE — ED PROVIDER NOTES
Encounter Date: 2019       History     Chief Complaint   Patient presents with    Rupture of Membranes     24 y.o.  @ 34w2d p/w gush of fluid around 2:30pm - clear. She denies any VB/ctx. +pelvic pressure. She called EMS right away.  This pregnancy is c/b Di/Di TIUP. She was diagnosed with a Ecoli UTI on UCx on  but did not start medication.        Review of patient's allergies indicates:   Allergen Reactions    Tree nut Hives     PEANUTS!!!     No past medical history on file.  No past surgical history on file.  Family History   Problem Relation Age of Onset    Hypertension Maternal Grandmother     Diabetes Maternal Grandmother     Breast cancer Neg Hx     Colon cancer Neg Hx     Ovarian cancer Neg Hx      Social History     Tobacco Use    Smoking status: Never Smoker   Substance Use Topics    Alcohol use: No    Drug use: No     Review of Systems   Constitutional: Negative for fever.   Eyes: Negative for visual disturbance.   Respiratory: Negative for shortness of breath.    Cardiovascular: Negative for chest pain.   Gastrointestinal: Negative for abdominal pain.   Genitourinary: Positive for pelvic pain. Negative for vaginal bleeding.   Musculoskeletal: Negative for back pain.   Skin: Negative for rash.   Neurological: Negative for light-headedness.   Hematological: Does not bruise/bleed easily.   Psychiatric/Behavioral: The patient is not nervous/anxious.        Physical Exam     Initial Vitals [19 1420]   BP Pulse Resp Temp SpO2   116/81 (!) 117 -- -- 97 %      MAP       --         Physical Exam    Vitals reviewed.  Constitutional: She appears well-developed and well-nourished. She is not diaphoretic. No distress.   Cardiovascular: Normal rate, regular rhythm, normal heart sounds and intact distal pulses.   Pulmonary/Chest: Breath sounds normal.   Abdominal: Soft. There is no tenderness. There is no guarding.   Gravid   Genitourinary:   Genitourinary Comments: +pooling, clear  (large amount)  Fetal breech parts palpated in vagina   Neurological: She is alert and oriented to person, place, and time. She has normal strength. No sensory deficit.   Psychiatric: She has a normal mood and affect. Her behavior is normal. Judgment and thought content normal.     OB LABOR EXAM:   Pre-Term Labor: Yes.   Membranes ruptured: Yes.   Method: Sterile vaginal exam per MD and Sterile speculum exam per MD.   Vaginal Bleeding: none present.   Engagement: engaged.   Dilatation: 6.   Station: 0.   Effacement: 80%.   Amniotic Fluid Color: clear.   Amniotic Fluid Amount: copious.   Comments: Fetal breech parts palpated in vagina       ED Course   Fetal non-stress test  Date/Time: 2019 4:36 PM  Performed by: Leni Cortes MD  Authorized by: Leni Cortes MD     Nonstress Test:     Variability:  6-25 BPM    Decelerations:  None    Accelerations:  15 bpm    Baby A baseline heart rate (BPM): A: 125; B: 130.    Contractions:  Irregular  Biophysical Profile:     Nonstress Test Interpretation: reactive      Overall Impression:  Reassuring      Labs Reviewed   CBC W/ AUTO DIFFERENTIAL   RPR   TYPE AND SCREEN LABOR & DELIVERY          Imaging Results    None          Medical Decision Making:   ED Management:  Patient urgently consented for  section due to PTL, PROM and breech malpresentation of twin A  BMZ deferred due to urgent delivery  L&D charge RN, Anes, NICU notified  Patient taken to OR                                 Clinical Impression:       ICD-10-CM ICD-9-CM   1. Breech presentation of fetus palpable vaginally, fetus 1 of multiple gestation O32.1XX1 652.23   2. Supervision of high risk pregnancy in third trimester O09.93 V23.9   3.  premature rupture of membranes with onset of labor within 24 hours of rupture in third trimester O42.013 658.13   4. Twin pregnancy, dichorionic/diamniotic, third trimester O30.043 651.03     V91.03                             Leni Cortes,  MD  12/26/19 1651       Leni Cortes MD  12/26/19 7283       Leni Cortes MD  12/26/19 6205

## 2019-12-26 NOTE — H&P
HISTORY AND PHYSICAL                                                OBSTETRICS          Subjective:       Miguel Angel Moreno is a24 y.o.  @ 34w2d p/w gush of fluid around 2:30pm - clear. She denies any VB/ctx. +pelvic pressure and some abdominal pain. She called EMS right away.  This pregnancy is complicated by Di/Di twin IUP. She was also diagnosed with a Ecoli UTI on UCx on  but did not start medication.    Review of Systems   Constitutional: Negative for fever.   Eyes: Negative for visual disturbance.   Respiratory: Negative for shortness of breath.    Cardiovascular: Negative for chest pain.   Gastrointestinal: Negative for abdominal pain.   Genitourinary: Positive for pelvic pain. Negative for vaginal bleeding.   Musculoskeletal: Negative for back pain.   Skin: Negative for rash.   Neurological: Negative for light-headedness.   Hematological: Does not bruise/bleed easily.   Psychiatric/Behavioral: The patient is not nervous/anxious.      PMHx: No past medical history on file.    PSHx: No past surgical history on file.    All:   Review of patient's allergies indicates:   Allergen Reactions    Tree nut Hives     PEANUTS!!!       Meds:   (Not in a hospital admission)    SH:   Social History     Socioeconomic History    Marital status: Single     Spouse name: Not on file    Number of children: Not on file    Years of education: Not on file    Highest education level: Not on file   Occupational History    Not on file   Social Needs    Financial resource strain: Not on file    Food insecurity:     Worry: Not on file     Inability: Not on file    Transportation needs:     Medical: Not on file     Non-medical: Not on file   Tobacco Use    Smoking status: Never Smoker   Substance and Sexual Activity    Alcohol use: No    Drug use: No    Sexual activity: Yes     Partners: Male     Birth control/protection: Condom   Lifestyle    Physical activity:     Days per week: Not on file     Minutes per  session: Not on file    Stress: Not on file   Relationships    Social connections:     Talks on phone: Not on file     Gets together: Not on file     Attends Yazidi service: Not on file     Active member of club or organization: Not on file     Attends meetings of clubs or organizations: Not on file     Relationship status: Not on file   Other Topics Concern    Not on file   Social History Narrative    Not on file       FH:   Family History   Problem Relation Age of Onset    Hypertension Maternal Grandmother     Diabetes Maternal Grandmother     Breast cancer Neg Hx     Colon cancer Neg Hx     Ovarian cancer Neg Hx        OBHx:   OB History    Para Term  AB Living   3 2 2 0 0 2   SAB TAB Ectopic Multiple Live Births   0 0 0 0 2      # Outcome Date GA Lbr Eric/2nd Weight Sex Delivery Anes PTL Lv   3 Current            2 Term 02/15/18 40w0d  3.09 kg (6 lb 13 oz) M Vag-Spont  N WILEY   1 Term 14 39w1d  3.075 kg (6 lb 12.5 oz) M Vag-Spont Spinal, EPI N WILEY      Complications: Nuchal cord      Name: TREVER DRAPER BOY      Apgar1: 9  Apgar5: 9       Objective:       /81   Pulse (!) 117   LMP 2019   SpO2 98%     Vitals:    19 1420 19 1430   BP: 116/81    Pulse: (!) 117 (!) 117   SpO2: 97% 98%       General:   alert, appears stated age and cooperative, no apparent distress   HENT:  normocephalic, atraumatic   Eyes:  extraocular movements and conjunctivae normal   Neck:  supple, range of motion normal, no thyromegaly   Lungs:   no respiratory distress   Heart:   regular rate   Abdomen:  soft, non-tender, non-distended but gravid, no rebound or guarding    Extremities negative edema, negative erythema   FHT: 125 Twin A, 130 Twin B, moderate BTBV, +accels, -decels;  Cat 1 (reassuring)                 TOCO: Irritability   Presentations: Baby A breech presentation on digital exam. Cervix 6cm dilated with ruptured membranes gross on exam and + nitrazine and pooling with  speculum exam     Lab Review  Blood Type O  GBBS: unknown  Rubella: Immune  RPR: nr  HIV: negative  HepB: negative       Assessment:       34w2d weeks gestation with breech presentation twins, grossly ruptured and 6cm dilated on exam in RAI.     Active Hospital Problems    Diagnosis  POA    Pregnancy, supervision, high-risk/flu/tdap/breast&bottle [O09.90]  Not Applicable      Resolved Hospital Problems   No resolved problems to display.          Plan:         1. PPROM, breech presentation of twin A  - Plan for urgent c/s  - Consents signed and to chart  - Admit to Labor and Delivery unit  - Epidural per Anesthesia  - Draw CBC, T&S  - Ancef OCTOR  - To OR for C/S. Case Request is in  - Ultrasound performed, infant in breech/breech position.   - Post-Partum Hemorrhage risk - medium   - BMZ deferred due to urgent delivery  - NICU notified    2. Recent UTI with positive culture  - On macrobid 100mg, will continue in hospital    3. Anemia  - CBC to be drawn on admission  - VSS  - Asymtomatic  - PO Fe/Colace after delivery    Beckie Soto M.D.   OB/GYN  PGY-1

## 2019-12-26 NOTE — TRANSFER OF CARE
Anesthesia Transfer of Care Note    Patient: Miguel Angel Moreno    Procedure(s) Performed: Procedure(s) (LRB):   SECTION (Left)    Patient location: PACU    Anesthesia Type: general    Transport from OR: Transported from OR on room air with adequate spontaneous ventilation    Post pain: adequate analgesia    Post assessment: no apparent anesthetic complications    Post vital signs: stable    Level of consciousness: awake    Nausea/Vomiting: no nausea/vomiting    Complications: none    Transfer of care protocol was followed      Last vitals:   Visit Vitals  /81   Pulse (!) 117   LMP 2019   SpO2 98%

## 2019-12-26 NOTE — ANESTHESIA PREPROCEDURE EVALUATION
[facility]  Anesthesia Pre-Operative Evaluation         Patient Name: Miguel Angel Moreno  YOB: 1995  MRN: [id number]    SUBJECTIVE:     Pre-operative evaluation for Procedure(s) (LRB):   SECTION (Left)     2019    Miguel Angel Moreno is a  24 y.o. female w/ an IUP at 34w2d a significant PMHx of di-di twins, transient hypertension in third trimester, anemia.    Patient now presents for the above procedure(s).      LDA: None documented.       Urethral Catheter 19 1530 Non-latex;Straight-tip 16 Fr. (Active)   Number of days: 0       Prev airway: None documented.    Drips: None documented.   lactated ringers         Patient Active Problem List   Diagnosis    Pregnancy, supervision, high-risk/flu/tdap/breast&bottle    Dichorionic diamniotic twin pregnancy    Limited prenatal care in third trimester    Transient hypertension of pregnancy in third trimester    Pelvic pain in pregnancy, antepartum, third trimester    Hypokalemia due to inadequate potassium intake    Anemia in pregnancy, third trimester    Proteinuria affecting pregnancy in third trimester       Review of patient's allergies indicates:   Allergen Reactions    Tree nut Hives     PEANUTS!!!       Current Inpatient Medications:   nitrofurantoin (macrocrystal-monohydrate)  100 mg Oral Q12H    oxytocin in lactated ringers  334 carolina-units/min Intravenous Once    oxytocin in lactated ringers  95 carolina-units/min Intravenous Once       No current facility-administered medications on file prior to encounter.      Current Outpatient Medications on File Prior to Encounter   Medication Sig Dispense Refill    aspirin (ECOTRIN) 81 MG EC tablet Take 1 tablet (81 mg total) by mouth once daily. (Patient not taking: Reported on 2019) 30 tablet 8    ferrous sulfate (FEOSOL) 325 mg (65 mg iron) Tab tablet Take 1 tablet (325 mg total) by mouth once daily. 30 tablet 0    folic acid (FOLVITE) 1 MG tablet Take  1 tablet (1 mg total) by mouth once daily. (Patient not taking: Reported on 11/13/2019) 100 tablet 3    nitrofurantoin, macrocrystal-monohydrate, (MACROBID) 100 MG capsule Take 1 capsule (100 mg total) by mouth 2 (two) times daily. for 7 days 14 capsule 0    ondansetron (ZOFRAN) 4 MG tablet Take 1 tablet (4 mg total) by mouth every 6 (six) hours. (Patient not taking: Reported on 11/13/2019) 12 tablet 0       No past surgical history on file.    Social History     Socioeconomic History    Marital status: Single     Spouse name: Not on file    Number of children: Not on file    Years of education: Not on file    Highest education level: Not on file   Occupational History    Not on file   Social Needs    Financial resource strain: Not on file    Food insecurity:     Worry: Not on file     Inability: Not on file    Transportation needs:     Medical: Not on file     Non-medical: Not on file   Tobacco Use    Smoking status: Never Smoker   Substance and Sexual Activity    Alcohol use: No    Drug use: No    Sexual activity: Yes     Partners: Male     Birth control/protection: Condom   Lifestyle    Physical activity:     Days per week: Not on file     Minutes per session: Not on file    Stress: Not on file   Relationships    Social connections:     Talks on phone: Not on file     Gets together: Not on file     Attends Confucianism service: Not on file     Active member of club or organization: Not on file     Attends meetings of clubs or organizations: Not on file     Relationship status: Not on file   Other Topics Concern    Not on file   Social History Narrative    Not on file       OBJECTIVE:     Vital Signs Range (Last 24H):  Pulse:  [117]   BP: (116)/(81)   SpO2:  [97 %-98 %]       Significant Labs:  Lab Results   Component Value Date    WBC 16.66 (H) 12/19/2019    HGB 8.5 (L) 12/19/2019    HCT 27.5 (L) 12/19/2019     (H) 12/19/2019    ALT 10 12/19/2019    AST 14 12/19/2019     12/19/2019     K 2.9 (L) 12/19/2019     12/19/2019    CREATININE 0.7 12/19/2019    BUN 4 (L) 12/19/2019    CO2 19 (L) 12/19/2019    TSH 1.14 03/11/2015    HGBA1C 5.0 12/19/2019       Diagnostic Studies: No relevant studies.    EKG:   No results found for this or any previous visit.    2D ECHO:  TTE:  No results found for this or any previous visit.    MIRZA:  No results found for this or any previous visit.    ASSESSMENT/PLAN:                                                                                                                    12/26/2019  Miguel Angel Moreno is a 24 y.o., female.    Anesthesia Evaluation    I have reviewed the Patient Summary Reports.    I have reviewed the Nursing Notes.      Review of Systems  Anesthesia Hx:  No problems with previous Anesthesia Denies Hx of Anesthetic complications  Neg history of prior surgery. Denies Family Hx of Anesthesia complications.   Denies Personal Hx of Anesthesia complications.   Social:  Non-Smoker, No Alcohol Use    EENT/Dental:EENT/Dental Normal   Cardiovascular:   Hypertension    Pulmonary:   Denies Shortness of breath. Recent URI (reports recent cough)    Hepatic/GI:  Hepatic/GI Normal    Neurological:  Neurology Normal    Endocrine:  Endocrine Normal        Physical Exam  General:  Well nourished    Airway/Jaw/Neck:  Airway Findings: Mouth Opening: Normal Tongue: Normal  General Airway Assessment: Adult  Mallampati: II  TM Distance: Normal, at least 6 cm        Eyes/Ears/Nose:  EYES/EARS/NOSE FINDINGS: Normal   Dental:  DENTAL FINDINGS: Normal   Chest/Lungs:  Chest/Lungs Clear    Heart/Vascular:  Heart Findings: Normal       Mental Status:  Mental Status Findings:  Cooperative, Alert and Oriented         Anesthesia Plan  Type of Anesthesia, risks & benefits discussed:  Anesthesia Type:  general, spinal, CSE, epidural  Patient's Preference:   Intra-op Monitoring Plan: standard ASA monitors  Intra-op Monitoring Plan Comments:   Post Op Pain Control Plan: per primary  service following discharge from PACU, IV/PO Opioids PRN and multimodal analgesia  Post Op Pain Control Plan Comments:   Induction:   IV  Beta Blocker:  Patient is not currently on a Beta-Blocker (No further documentation required).       Informed Consent: Patient understands risks and agrees with Anesthesia plan.  Questions answered. Anesthesia consent signed with patient.  ASA Score: 3     Day of Surgery Review of History & Physical:     H&P completed by Anesthesiologist.       Ready For Surgery From Anesthesia Perspective.

## 2019-12-26 NOTE — L&D DELIVERY NOTE
Section Operative Note    Procedure Date: 2019    Procedure: Emergent Primary  Section via pfannenstiel skin incision    Indications:  labor, twin A with breech parts palpated in vagina    Pre-operative Diagnosis:   1. IUP at 34 week 2 day pregnancy  2. Di-di twin pregnancy, twin A breech presentation  3.  labor  4. SROM   5. Anemia    Post-operative Diagnosis:   Same, now s/p  section    Surgeon: Leni Cortes MD     Assistants: Beckie Soto MD - Resident    Anesthesia: Spinal anesthesia    Findings:   1. Viable male x2, twin A breech, twin B transverse  2. Normal appearing uterus, fallopian tubes, and ovaries.  3. Normal di/di placenta.    Estimated Blood Loss:  450 mL      Specimens:   1. Placenta which was sent to pathology  2. Cord gases for baby A and baby B    PreOp CBC:   Lab Results   Component Value Date    WBC 20.25 (H) 2019    HGB 10.0 (L) 2019    HCT 31.7 (L) 2019    MCV 82 2019     2019                     Complications:  None; patient tolerated the procedure well.           Disposition: PACU - hemodynamically stable.           Condition: stable    Procedure Details   The patient was seen in the RAI after determining that she had PPROM'ed twin A, and was 6cm dilated with breech of twin A palpated, [facility] The risks, benefits, complications, treatment options, and expected outcomes were discussed with the patient.  The patient concurred with the proposed plan, giving informed consent.  The site of surgery properly noted/marked. The patient was taken to Operating Room, identified as Miguel Angel Moreno and the procedure verified as  Delivery. A Time Out was held and the above information confirmed.    After induction of anesthesia, the patient was prepped and draped in the usual sterile manner while placed in a dorsal supine position with a left lateral tilt.  A  jorgensen catheter was also placed per nursing. Preoperative antibiotics were administered and an allis test was performed yielding adequate anesthesia.  A Pfannenstiel incision was made and carried down through the subcutaneous tissue to the fascia. Fascia was entered manually and extended. The peritoneum was identified,and entered bluntly with fingers. Peritoneal incision was extended longitudinally. The vesico-uterine peritoneum was identified and bladder blade was inserted. A low transverse uterine incision was made with knife and extended with fingers. The infant was noted to be in breech position with an arm presentation. The fetal buttox was brought to the incision and elevated out of the pelvis and delivered using normal breech maneuvers. The second amniotic sac was then entered with hemostats, and baby B was noted to be in transverse position. The infants head was lifted to the incision and delivered without difficulty. Baby A apgars 8/9 and baby B apgars 8/9. Baby A weight 1880g and baby B weight 2000g. Cord gases were collected and sent.   After the umbilical cord was clamped and cut, cord blood was obtained for evaluation. Both placentas were removed intact and appeared normal, and were sent to pathology. The uterus was exteriorized. The uterine outline, tubes and ovaries appeared normal. The uterine incision was closed with running suture of 1 vicryl followed by an imbricating layer of the same suture. Hemostasis was observed. The uterus was returned to the abdominal cavity. Incision was reinspected and good hemostasis was noted. The abdominal cavity was irrigated to remove clots. The fascia was then closed with running sutures of 1 Vicryl. The subcutaneous fat and skin was reapproximated with 3-0 plain gut and 4-0 monocryl respectively.    Instrument, sponge, and needle counts were correct prior the abdominal closure and at the conclusion of the case.     Pt tolerated procedure well and was in good  condition after the procedure.    **NOTE: This patient IS a candidate for trial of labor after  delivery.**            Josh, NICK Michelle [[id number]]     Delivery Information for A Tima Moreno    Birth information:  YOB: 2019   Time of birth: 3:40 PM   Sex: male   Head Delivery Date/Time: 2019  3:40 PM   Delivery type: , Low Transverse   Gestational Age: 34w2d    Delivery Providers    Delivering clinician:  Leni Cortes MD   Provider Role    Beckie Soto MD Resident    Brandi Mascorro, WEI Charge Nurse    Marquita Keys RN Circulator    [facility]    Leni Landers, [facility] Surgical Tech    Jannette Noguera MD Anesthesiologist    Karl Bowden MD Resident    Jorge Lott RN Registered Nurse            Measurements    Weight:  1880 g  Length:           Apgars    Living status:  Living  Apgars:   1 min.:   5 min.:   10 min.:   15 min.:   20 min.:     Skin color:   0  1       Heart rate:   2  2       Reflex irritability:   2  2       Muscle tone:   2  2       Respiratory effort:   2  2       Total:   8  9       Apgars assigned by:  NICU         Operative Delivery    Forceps attempted?:  No  Vacuum extractor attempted?:  No         Shoulder Dystocia    Shoulder dystocia present?:  No           Presentation    Presentation:  Footling Breech           Interventions/Resuscitation    Method:  NICU Attended       Cord    Vessels:  3 vessels  Complications:  None  Delayed Cord Clamping?:  No  Cord Clamped Date/Time:  2019  3:40 PM  Cord Blood Disposition:  Sent with Baby  Gases Sent?:  Yes  Stem Cell Collection (by MD):  No       Placenta    Placenta delivery date/time:  2019 1543  Placenta removal:  Manual removal  Placenta appearance:  Intact  Placenta disposition:  pathology           Labor Events:       labor: Yes     Labor Onset Date/Time:         Dilation Complete Date/Time:         Start Pushing Date/Time:        Rupture Date/Time: 19  1430         Rupture type:           Fluid Amount:        Fluid Color:        Fluid Odor:        Membrane Status (PeriCalm):        Rupture Date/Time (PeriCalm):        Fluid Amount (PeriCalm):        Fluid Color (PeriCalm):         steroids: None     Antibiotics given for GBS: No     Induction:       Indications for induction:        Augmentation:       Indications for augmentation:       Labor complications: None     Additional complications:          Cervical ripening:                     Delivery:      Episiotomy: None     Indication for Episiotomy:       Perineal Lacerations: None Repaired:      Periurethral Laceration:   Repaired:     Labial Laceration:   Repaired:     Sulcus Laceration:   Repaired:     Vaginal Laceration:   Repaired:     Cervical Laceration:   Repaired:     Repair suture:       Repair # of packets: 7     Last Value - EBL - Nursing (mL): 0     Sum - EBL - Nursing (mL): 0     Last Value - EBL - Anesthesia (mL):      Calculated QBL (mL): 450      Vaginal Sweep Performed: No     Surgicount Correct: Yes       Other providers:       Anesthesia    Method:  Spinal          Details (if applicable):  Trial of Labor No    Categorization: Primary    Priority: Emergency   Indications for : Breech;Multiple Gestation   Incision Type: low transverse     Additional  information:  Forceps:    Vacuum:    Breech:    Observed anomalies    Other (Comments):            Josh HAJA Alfred Matthewanthony [[id number]]     Delivery Information for HAJA Moreno    Birth information:  YOB: 2019   Time of birth: 3:41 PM   Sex: male   Head Delivery Date/Time: 2019  3:41 PM   Delivery type: , Low Transverse   Gestational Age: 34w2d    Delivery Providers    Delivering clinician:  Leni Cortes MD   Provider Role    Beckie Soto MD Resident    Brandi Mascorro, [facility]I Charge Nurse    Marquita Keys RN Circulator    Miryam  [facility] Surgical Tech    Leni Landers, [facility] Surgical Tech    Jorge Lott, RN Registered Nurse    Jannette Noguera MD Anesthesiologist    Karl Bowden MD Resident            Measurements    Weight:  2000 g  Length:           Apgars    Living status:  Living  Apgars:   1 min.:   5 min.:   10 min.:   15 min.:   20 min.:     Skin color:   0  1       Heart rate:   2  2       Reflex irritability:   2  2       Muscle tone:   2  2       Respiratory effort:   2  2       Total:   8  9       Apgars assigned by:  NICU         Operative Delivery    Forceps attempted?:  No  Vacuum extractor attempted?:  No         Shoulder Dystocia    Shoulder dystocia present?:  No           Presentation    Presentation:  Transverse  Position:  Middle           Interventions/Resuscitation    Method:  NICU Attended       Cord    Vessels:  3 vessels  Complications:  None  Delayed Cord Clamping?:  No  Cord Clamped Date/Time:  2019  3:41 PM  Cord Blood Disposition:  Sent with Baby  Gases Sent?:  Yes  Stem Cell Collection (by MD):  No       Placenta    Placenta delivery date/time:  2019 1543  Placenta removal:  Manual removal  Placenta appearance:  Intact  Placenta disposition:  pathology           Labor Events:       labor: Yes     Labor Onset Date/Time:         Dilation Complete Date/Time:         Start Pushing Date/Time:       Rupture Date/Time: 19  1430         Rupture type:           Fluid Amount:        Fluid Color:        Fluid Odor:        Membrane Status (PeriCalm):        Rupture Date/Time (PeriCalm):        Fluid Amount (PeriCalm):        Fluid Color (PeriCalm):         steroids: None     Antibiotics given for GBS: No     Induction:       Indications for induction:        Augmentation:       Indications for augmentation:       Labor complications: None     Additional complications:          Cervical ripening:                     Delivery:      Episiotomy: None     Indication for  Episiotomy:       Perineal Lacerations: None Repaired:      Periurethral Laceration:   Repaired:     Labial Laceration:   Repaired:     Sulcus Laceration:   Repaired:     Vaginal Laceration:   Repaired:     Cervical Laceration:   Repaired:     Repair suture:       Repair # of packets: 7     Last Value - EBL - Nursing (mL): 0     Sum - EBL - Nursing (mL): 0     Last Value - EBL - Anesthesia (mL):      Calculated QBL (mL): 450      Vaginal Sweep Performed:       Surgicount Correct:         Other providers:       Anesthesia    Method:  Spinal          Details (if applicable):  Trial of Labor No    Categorization: Primary    Priority: Emergency   Indications for : Malposition;Multiple Gestation   Incision Type: low transverse     Additional  information:  Forceps:    Vacuum:    Breech:    Observed anomalies    Other (Comments):         Beckie Soto M.D.   OB/GYN  PGY-1

## 2019-12-27 LAB
ALBUMIN SERPL BCP-MCNC: 1.5 G/DL (ref 3.5–5.2)
ALP SERPL-CCNC: 145 U/L (ref 55–135)
ALP SERPL-CCNC: 156 U/L (ref 55–135)
ALP SERPL-CCNC: 167 U/L (ref 55–135)
ALP SERPL-CCNC: 175 U/L (ref 55–135)
ALP SERPL-CCNC: 183 U/L (ref 55–135)
ALT SERPL W/O P-5'-P-CCNC: 5 U/L (ref 10–44)
ALT SERPL W/O P-5'-P-CCNC: 6 U/L (ref 10–44)
ALT SERPL W/O P-5'-P-CCNC: 7 U/L (ref 10–44)
ALT SERPL W/O P-5'-P-CCNC: 8 U/L (ref 10–44)
ALT SERPL W/O P-5'-P-CCNC: 9 U/L (ref 10–44)
ANION GAP SERPL CALC-SCNC: 10 MMOL/L (ref 8–16)
ANION GAP SERPL CALC-SCNC: 8 MMOL/L (ref 8–16)
ANION GAP SERPL CALC-SCNC: 8 MMOL/L (ref 8–16)
ANION GAP SERPL CALC-SCNC: 9 MMOL/L (ref 8–16)
ANION GAP SERPL CALC-SCNC: 9 MMOL/L (ref 8–16)
ANISOCYTOSIS BLD QL SMEAR: SLIGHT
AST SERPL-CCNC: 13 U/L (ref 10–40)
AST SERPL-CCNC: 14 U/L (ref 10–40)
AST SERPL-CCNC: 15 U/L (ref 10–40)
AST SERPL-CCNC: 16 U/L (ref 10–40)
AST SERPL-CCNC: 16 U/L (ref 10–40)
BASOPHILS # BLD AUTO: ABNORMAL K/UL (ref 0–0.2)
BASOPHILS NFR BLD: 0 % (ref 0–1.9)
BILIRUB SERPL-MCNC: 0.3 MG/DL (ref 0.1–1)
BILIRUB SERPL-MCNC: 0.4 MG/DL (ref 0.1–1)
BILIRUB SERPL-MCNC: 0.5 MG/DL (ref 0.1–1)
BUN SERPL-MCNC: 18 MG/DL (ref 6–20)
BUN SERPL-MCNC: 20 MG/DL (ref 6–20)
BUN SERPL-MCNC: 21 MG/DL (ref 6–20)
BUN SERPL-MCNC: 22 MG/DL (ref 6–20)
BUN SERPL-MCNC: 26 MG/DL (ref 6–20)
BURR CELLS BLD QL SMEAR: ABNORMAL
CALCIUM SERPL-MCNC: 8 MG/DL (ref 8.7–10.5)
CALCIUM SERPL-MCNC: 8.1 MG/DL (ref 8.7–10.5)
CALCIUM SERPL-MCNC: 8.3 MG/DL (ref 8.7–10.5)
CALCIUM SERPL-MCNC: 8.3 MG/DL (ref 8.7–10.5)
CALCIUM SERPL-MCNC: 8.5 MG/DL (ref 8.7–10.5)
CHLORIDE SERPL-SCNC: 103 MMOL/L (ref 95–110)
CHLORIDE SERPL-SCNC: 104 MMOL/L (ref 95–110)
CO2 SERPL-SCNC: 21 MMOL/L (ref 23–29)
CO2 SERPL-SCNC: 22 MMOL/L (ref 23–29)
CO2 SERPL-SCNC: 22 MMOL/L (ref 23–29)
CREAT SERPL-MCNC: 1.7 MG/DL (ref 0.5–1.4)
CREAT SERPL-MCNC: 1.8 MG/DL (ref 0.5–1.4)
CREAT SERPL-MCNC: 2 MG/DL (ref 0.5–1.4)
DIFFERENTIAL METHOD: ABNORMAL
EOSINOPHIL # BLD AUTO: ABNORMAL K/UL (ref 0–0.5)
EOSINOPHIL NFR BLD: 1 % (ref 0–8)
ERYTHROCYTE [DISTWIDTH] IN BLOOD BY AUTOMATED COUNT: 14.4 % (ref 11.5–14.5)
EST. GFR  (AFRICAN AMERICAN): 39 ML/MIN/1.73 M^2
EST. GFR  (AFRICAN AMERICAN): 45 ML/MIN/1.73 M^2
EST. GFR  (AFRICAN AMERICAN): 48 ML/MIN/1.73 M^2
EST. GFR  (NON AFRICAN AMERICAN): 34 ML/MIN/1.73 M^2
EST. GFR  (NON AFRICAN AMERICAN): 39 ML/MIN/1.73 M^2
EST. GFR  (NON AFRICAN AMERICAN): 42 ML/MIN/1.73 M^2
GLUCOSE SERPL-MCNC: 71 MG/DL (ref 70–110)
GLUCOSE SERPL-MCNC: 81 MG/DL (ref 70–110)
GLUCOSE SERPL-MCNC: 82 MG/DL (ref 70–110)
GLUCOSE SERPL-MCNC: 96 MG/DL (ref 70–110)
GLUCOSE SERPL-MCNC: 97 MG/DL (ref 70–110)
HCT VFR BLD AUTO: 28.9 % (ref 37–48.5)
HGB BLD-MCNC: 9.4 G/DL (ref 12–16)
IMM GRANULOCYTES # BLD AUTO: ABNORMAL K/UL (ref 0–0.04)
IMM GRANULOCYTES NFR BLD AUTO: ABNORMAL % (ref 0–0.5)
LACTATE SERPL-SCNC: 1.4 MMOL/L (ref 0.5–2.2)
LYMPHOCYTES # BLD AUTO: ABNORMAL K/UL (ref 1–4.8)
LYMPHOCYTES NFR BLD: 16 % (ref 18–48)
MAGNESIUM SERPL-MCNC: 3.3 MG/DL (ref 1.6–2.6)
MAGNESIUM SERPL-MCNC: 3.3 MG/DL (ref 1.6–2.6)
MAGNESIUM SERPL-MCNC: 3.5 MG/DL (ref 1.6–2.6)
MAGNESIUM SERPL-MCNC: 3.9 MG/DL (ref 1.6–2.6)
MCH RBC QN AUTO: 26.6 PG (ref 27–31)
MCHC RBC AUTO-ENTMCNC: 32.5 G/DL (ref 32–36)
MCV RBC AUTO: 82 FL (ref 82–98)
MONOCYTES # BLD AUTO: ABNORMAL K/UL (ref 0.3–1)
MONOCYTES NFR BLD: 1 % (ref 4–15)
MYELOCYTES NFR BLD MANUAL: 1 %
NEUTROPHILS NFR BLD: 80 % (ref 38–73)
NEUTS BAND NFR BLD MANUAL: 1 %
NRBC BLD-RTO: 1 /100 WBC
PLATELET # BLD AUTO: 228 K/UL (ref 150–350)
PLATELET BLD QL SMEAR: ABNORMAL
PMV BLD AUTO: 10.4 FL (ref 9.2–12.9)
POIKILOCYTOSIS BLD QL SMEAR: SLIGHT
POLYCHROMASIA BLD QL SMEAR: ABNORMAL
POTASSIUM SERPL-SCNC: 3.5 MMOL/L (ref 3.5–5.1)
POTASSIUM SERPL-SCNC: 3.6 MMOL/L (ref 3.5–5.1)
POTASSIUM SERPL-SCNC: 3.7 MMOL/L (ref 3.5–5.1)
POTASSIUM SERPL-SCNC: 3.9 MMOL/L (ref 3.5–5.1)
POTASSIUM SERPL-SCNC: 4 MMOL/L (ref 3.5–5.1)
PROT SERPL-MCNC: 5.3 G/DL (ref 6–8.4)
PROT SERPL-MCNC: 5.4 G/DL (ref 6–8.4)
PROT SERPL-MCNC: 5.6 G/DL (ref 6–8.4)
PROT SERPL-MCNC: 5.6 G/DL (ref 6–8.4)
PROT SERPL-MCNC: 5.7 G/DL (ref 6–8.4)
RBC # BLD AUTO: 3.54 M/UL (ref 4–5.4)
SODIUM SERPL-SCNC: 134 MMOL/L (ref 136–145)
WBC # BLD AUTO: 25.05 K/UL (ref 3.9–12.7)

## 2019-12-27 PROCEDURE — 63600175 PHARM REV CODE 636 W HCPCS: Performed by: STUDENT IN AN ORGANIZED HEALTH CARE EDUCATION/TRAINING PROGRAM

## 2019-12-27 PROCEDURE — 99233 PR SUBSEQUENT HOSPITAL CARE,LEVL III: ICD-10-PCS | Mod: ,,, | Performed by: OBSTETRICS & GYNECOLOGY

## 2019-12-27 PROCEDURE — 11000001 HC ACUTE MED/SURG PRIVATE ROOM

## 2019-12-27 PROCEDURE — 85027 COMPLETE CBC AUTOMATED: CPT

## 2019-12-27 PROCEDURE — 25000003 PHARM REV CODE 250: Performed by: STUDENT IN AN ORGANIZED HEALTH CARE EDUCATION/TRAINING PROGRAM

## 2019-12-27 PROCEDURE — 83735 ASSAY OF MAGNESIUM: CPT | Mod: 91

## 2019-12-27 PROCEDURE — 36415 COLL VENOUS BLD VENIPUNCTURE: CPT

## 2019-12-27 PROCEDURE — 83735 ASSAY OF MAGNESIUM: CPT

## 2019-12-27 PROCEDURE — 99233 SBSQ HOSP IP/OBS HIGH 50: CPT | Mod: ,,, | Performed by: OBSTETRICS & GYNECOLOGY

## 2019-12-27 PROCEDURE — 80053 COMPREHEN METABOLIC PANEL: CPT | Mod: 91

## 2019-12-27 PROCEDURE — 83605 ASSAY OF LACTIC ACID: CPT

## 2019-12-27 PROCEDURE — 80053 COMPREHEN METABOLIC PANEL: CPT

## 2019-12-27 PROCEDURE — 85007 BL SMEAR W/DIFF WBC COUNT: CPT

## 2019-12-27 RX ORDER — CEFTRIAXONE 1 G/50ML
1 INJECTION, SOLUTION INTRAVENOUS
Status: DISCONTINUED | OUTPATIENT
Start: 2019-12-27 | End: 2019-12-30 | Stop reason: HOSPADM

## 2019-12-27 RX ORDER — ENOXAPARIN SODIUM 100 MG/ML
30 INJECTION SUBCUTANEOUS EVERY 24 HOURS
Status: DISCONTINUED | OUTPATIENT
Start: 2019-12-27 | End: 2019-12-28

## 2019-12-27 RX ORDER — MAGNESIUM SULFATE HEPTAHYDRATE 40 MG/ML
4 INJECTION, SOLUTION INTRAVENOUS ONCE
Status: DISCONTINUED | OUTPATIENT
Start: 2019-12-27 | End: 2019-12-27

## 2019-12-27 RX ADMIN — SODIUM CHLORIDE, SODIUM LACTATE, POTASSIUM CHLORIDE, AND CALCIUM CHLORIDE 500 ML: .6; .31; .03; .02 INJECTION, SOLUTION INTRAVENOUS at 11:12

## 2019-12-27 RX ADMIN — ACETAMINOPHEN 650 MG: 325 TABLET ORAL at 11:12

## 2019-12-27 RX ADMIN — ACETAMINOPHEN 650 MG: 325 TABLET ORAL at 05:12

## 2019-12-27 RX ADMIN — KETOROLAC TROMETHAMINE 30 MG: 30 INJECTION, SOLUTION INTRAMUSCULAR; INTRAVENOUS at 12:12

## 2019-12-27 RX ADMIN — DOCUSATE SODIUM 200 MG: 100 CAPSULE, LIQUID FILLED ORAL at 09:12

## 2019-12-27 RX ADMIN — IBUPROFEN 800 MG: 400 TABLET, FILM COATED ORAL at 05:12

## 2019-12-27 RX ADMIN — SODIUM CHLORIDE, SODIUM LACTATE, POTASSIUM CHLORIDE, AND CALCIUM CHLORIDE 500 ML: .6; .31; .03; .02 INJECTION, SOLUTION INTRAVENOUS at 02:12

## 2019-12-27 RX ADMIN — PRENATAL VIT W/ FE FUMARATE-FA TAB 27-0.8 MG 1 TABLET: 27-0.8 TAB at 09:12

## 2019-12-27 RX ADMIN — CEFTRIAXONE 1 G: 1 INJECTION, SOLUTION INTRAVENOUS at 11:12

## 2019-12-27 RX ADMIN — DOCUSATE SODIUM 200 MG: 100 CAPSULE, LIQUID FILLED ORAL at 08:12

## 2019-12-27 RX ADMIN — FERROUS SULFATE TAB EC 325 MG (65 MG FE EQUIVALENT) 325 MG: 325 (65 FE) TABLET DELAYED RESPONSE at 09:12

## 2019-12-27 RX ADMIN — KETOROLAC TROMETHAMINE 30 MG: 30 INJECTION, SOLUTION INTRAMUSCULAR; INTRAVENOUS at 05:12

## 2019-12-27 RX ADMIN — ACETAMINOPHEN 650 MG: 325 TABLET ORAL at 12:12

## 2019-12-27 RX ADMIN — OXYCODONE HYDROCHLORIDE 10 MG: 5 TABLET ORAL at 09:12

## 2019-12-27 RX ADMIN — ENOXAPARIN SODIUM 30 MG: 30 INJECTION, SOLUTION INTRAVENOUS; SUBCUTANEOUS at 05:12

## 2019-12-27 RX ADMIN — KETOROLAC TROMETHAMINE 30 MG: 30 INJECTION, SOLUTION INTRAMUSCULAR; INTRAVENOUS at 11:12

## 2019-12-27 NOTE — ANESTHESIA POSTPROCEDURE EVALUATION
Anesthesia Post Evaluation    Patient: Miguel Angel Moreno    Procedure(s) Performed: Procedure(s) (LRB):   SECTION (Left)    Final Anesthesia Type: spinal    Patient location during evaluation: labor & delivery  Patient participation: Yes- Able to Participate  Level of consciousness: awake and alert, oriented and awake  Post-procedure vital signs: reviewed and stable  Pain management: adequate  Airway patency: patent    PONV status at discharge: No PONV  Anesthetic complications: no      Cardiovascular status: blood pressure returned to baseline  Respiratory status: unassisted, spontaneous ventilation and room air  Hydration status: euvolemic  Follow-up not needed.          Vitals Value Taken Time   /78 2019 11:03 AM   Temp 36.2 °C (97.2 °F) 2019  8:00 AM   Pulse 57 2019 11:33 AM   Resp 18 2019  2:24 AM   SpO2 97 % 2019 11:33 AM   Vitals shown include unvalidated device data.      Event Time     Out of Recovery 19:45:00          Pain/Elyssa Score: Pain Rating Prior to Med Admin: 2 (2019 11:12 AM)  Pain Rating Post Med Admin: 0 (2019  1:12 AM)

## 2019-12-27 NOTE — PROGRESS NOTES
Magnesium Assessment Note    Subjective:         Miguel Angel Moreno is a 24 y.o. female at POD #1 s/p LTCS on IV MgSO4 for seizure prophylaxis.    Patient denies headaches, denies blurry vision and denies right upper quadrant pain. Denies CP, denies SOB. Patient reports no nausea/no vomiting.     Objective:      Temp:  [93.9 °F (34.4 °C)-96.9 °F (36.1 °C)] 94.3 °F (34.6 °C)  Pulse:  [] 73  Resp:  [16-18] 18  SpO2:  [94 %-99 %] 96 %  BP: ()/(55-92) 90/58    I/O last 3 completed shifts:  In: 234.7 [I.V.:234.7]  Out: 450 [Blood:450]  I/O this shift:  In: 180 [I.V.:80; IV Piggyback:100]  Out: 425 [Urine:425]    General:   alert, appears stated age and cooperative   Lungs:   clear to auscultation bilaterally   Heart::   regular rate and rhythm, S1, S2 normal, no murmur, click, rub or gallop   Extremities: peripheral pulses normal, no pedal edema, no clubbing or cyanosis   DTRs- 2+  bilaterally, normothermic       Lab Review  Recent Results (from the past 24 hour(s))   ISTAT PROCEDURE    Collection Time: 12/26/19  4:14 PM   Result Value Ref Range    POC PH 7.249 (L) 7.35 - 7.45    POC PCO2 52.7 (H) 35 - 45 mmHg    POC PO2 9 (L) 80 - 100 mmHg    POC HCO3 23.1 (L) 24 - 28 mmol/L    POC BE -4 -2 to 2 mmol/L    POC SATURATED O2 6 (L) 95 - 100 %    Sample CRD A     Site Other     Allens Test N/A     DelSys Room Air     Mode SPONT    ISTAT PROCEDURE    Collection Time: 12/26/19  4:16 PM   Result Value Ref Range    POC PH 7.288 (L) 7.35 - 7.45    POC PCO2 46.2 (H) 35 - 45 mmHg    POC PO2 14 (L) 80 - 100 mmHg    POC HCO3 22.1 (L) 24 - 28 mmol/L    POC BE -4 -2 to 2 mmol/L    POC SATURATED O2 15 (L) 95 - 100 %    Sample CRD V     Site Other     Allens Test N/A     DelSys Room Air     Mode SPONT    Type & Screen, Labor & Delivery    Collection Time: 12/26/19  7:10 PM   Result Value Ref Range    Group & Rh O POS     Indirect [place] NEG    Comprehensive metabolic panel    Collection Time: 12/26/19  7:10 PM   Result Value Ref  Range    Sodium 134 (L) 136 - 145 mmol/L    Potassium 3.4 (L) 3.5 - 5.1 mmol/L    Chloride 103 95 - 110 mmol/L    CO2 20 (L) 23 - 29 mmol/L    Glucose 108 70 - 110 mg/dL    BUN, Bld 18 6 - 20 mg/dL    Creatinine 1.7 (H) 0.5 - 1.4 mg/dL    Calcium 8.3 (L) 8.7 - 10.5 mg/dL    Total Protein 6.1 6.0 - 8.4 g/dL    Albumin 1.6 (L) 3.5 - 5.2 g/dL    Total Bilirubin 0.6 0.1 - 1.0 mg/dL    Alkaline Phosphatase 204 (H) 55 - 135 U/L    AST 15 10 - 40 U/L    ALT 6 (L) 10 - 44 U/L    Anion Gap 11 8 - 16 mmol/L    eGFR if African American 48 (A) >60 mL/min/1.73 m^2    eGFR if non African American 42 (A) >60 mL/min/1.73 m^2   CBC auto differential    Collection Time: 19  7:10 PM   Result Value Ref Range    WBC 20.25 (H) 3.90 - 12.70 K/uL    RBC 3.85 (L) 4.00 - 5.40 M/uL    Hemoglobin 10.0 (L) 12.0 - 16.0 g/dL    Hematocrit 31.7 (L) 37.0 - 48.5 %    Mean Corpuscular Volume 82 82 - 98 fL    Mean Corpuscular Hemoglobin 26.0 (L) 27.0 - 31.0 pg    Mean Corpuscular Hemoglobin Conc 31.5 (L) 32.0 - 36.0 g/dL    RDW 14.4 11.5 - 14.5 %    Platelets 249 150 - 350 K/uL    MPV 10.4 9.2 - 12.9 fL    Immature Granulocytes CANCELED 0.0 - 0.5 %    Immature Grans (Abs) CANCELED 0.00 - 0.04 K/uL    nRBC 1 (A) 0 /100 WBC    Gran% 81.0 (H) 38.0 - 73.0 %    Lymph% 8.9 (L) 18.0 - 48.0 %    Mono% 2.5 (L) 4.0 - 15.0 %    Eosinophil% 2.5 0.0 - 8.0 %    Basophil% 0.0 0.0 - 1.9 %    Bands 5.1 %    Platelet Estimate Appears normal     Aniso Slight     Poik Slight     Ovalocytes Occasional     Differential Method Manual    Protein / creatinine ratio, urine    Collection Time: 19  7:30 PM   Result Value Ref Range    Protein, Urine Random 151 (H) 0 - 15 mg/dL    Creatinine, Random Ur 194.7 15.0 - 325.0 mg/dL    Prot/Creat Ratio, Ur 0.78 (H) 0.00 - 0.20   Magnesium    Collection Time: 19 11:46 PM   Result Value Ref Range    Magnesium 3.9 (H) 1.6 - 2.6 mg/dL        Assessment:     24 y.o.  female at 34w2d, on IV magnesium  sulfate.    Active Hospital Problems    Diagnosis  POA    *S/P emergency  section (di-di twins breech SROM@34 weeks) [Z98.891]  Not Applicable    Pregnancy, supervision, high-risk [O09.90]  Not Applicable      Resolved Hospital Problems    Diagnosis Date Resolved POA    Pregnancy, supervision, high-risk/flu/tdap/breast&bottle [O09.90] 2019 Not Applicable        Plan:     - S/p bolus Mag due to renal dose   - initial mag level 3.9. Continue to monitor q6 hr  - Close maternal monitoring including UOP and BP   - UOP ~30 cc/hr  - Recheck in 2-4 hours or PRN  - Low body temp noted on vitals. Patient is not symptomatic. Does not feel cool on exam. Continue to monitor    Onur Alvarado MD

## 2019-12-27 NOTE — NURSING
Dr. Soto in pt room. Notified Dr. Soto of pts low urine output. Orders received to bolus pt 250ml of LR now and increase continues LR to 75ml/hr after bolus.

## 2019-12-27 NOTE — PROGRESS NOTES
POSTPARTUM PROGRESS NOTE     Miguel Angel Moreno is a 24 y.o. female POD #1 status post Primary  section at 34w2d in a pregnancy complicated by complicated by Di/Di twin IUP with breech presentation and SROM on admission. She was also diagnosed with a Ecoli UTI on UCx and was started on treatment with this admission. Patient is doing well this morning. She denies nausea, vomiting, fever or chills.  She denies HA, SOB, CP, RUQ pain.     Patient reports mild abdominal pain that is adequately relieved by oral pain medications. Lochia is mild to moderate  and stable. Patient is voiding without difficulty and ambulating with no difficulty. She has passed flatus.  Patient does plan to breast feed. Still deciding on contraception.     Objective:       Temp:  [93.9 °F (34.4 °C)-97.3 °F (36.3 °C)] 97.3 °F (36.3 °C)  Pulse:  [] 70  Resp:  [16-18] 18  SpO2:  [94 %-99 %] 95 %  BP: ()/(55-92) 90/58    General:   alert, appears stated age and cooperative   Lungs:   Non-labored respirations, Lungs CTAB   Heart:   regular rate and rhythm   Abdomen:  Soft, nondistended, No RUQ pain   Uterus:  firm located at the umblicus, no fundal tenderness    Incision: Bandage in place, clean, dry and intact   Extremities: no pedal edema noted, 2+ reflexes bilateralluy     Lab Review  Recent Labs   Lab 19   WBC 20.25*   HGB 10.0*   HCT 31.7*   MCV 82           Recent Labs   Lab 19  2346   * 134*   K 3.4* 3.5    103   CO2 20* 21*   BUN 18 18   CREATININE 1.7* 1.7*    96   PROT 6.1 5.7*   BILITOT 0.6 0.5   ALKPHOS 204* 156*   ALT 6* 8*   AST 15 16   MG  --  3.9*          I/O    Intake/Output Summary (Last 24 hours) at 2019 7561  Last data filed at 2019 0435  Gross per 24 hour   Intake 1339.66 ml   Output 964 ml   Net 375.66 ml        Assessment:     Patient Active Problem List   Diagnosis    Dichorionic diamniotic twin pregnancy    Limited prenatal care in  third trimester    Transient hypertension of pregnancy in third trimester    Pelvic pain in pregnancy, antepartum, third trimester    Hypokalemia due to inadequate potassium intake    Anemia in pregnancy, third trimester    Proteinuria affecting pregnancy in third trimester    S/P emergency  section (di-di twins breech SROM@34 weeks)    Pregnancy, supervision, high-risk        Plan:   1. Postpartum care:  - Patient doing well. Continue routine management and advances.  - Continue PO pain meds. Pain well controlled.  - Heme: H/h 10/32 >>> pending  - Encourage ambulation  - Circumcision - infants in NICU  - Contraception - still deciding  - Lactation consult PRN    2. PreE with SF (kidney function/ Cr)  - BP: ()/(55-92) 90/58  - On MgSO4 for seizure ppx x24 hrs after delivery. Due to Cr 1.7, only giving Mg bolus for dosing  - Asymptomatic this AM  - Plt 239, AST/ALT /16  Cr 1.7 (increased from baseline 1 week prior)  - Mag levels, CBC, and CMP q6 hours- repeat pending this morning  - Lactic Acid wnl    3. Decreased UOP  - s/p 500cc bolus and output continues to not be adequte  - Will give another 250cc bolus and increased basal rate of fluids to 75cc/hr    4. Ecoli UTI  - Patienet currently on macrobid 100mg    Dispo: As patient meets milestones, will plan to discharge 2-4.    Beckie Soto M.D.   OB/GYN  PGY-1

## 2019-12-27 NOTE — NURSING
Notified Dr. Alvarado of pt's low temp 94 oral at 2005 and pt felt cool, coloring normal and pt had no complaints. Place warm blankets at 2010. Recheck at 2100 and temp is 95.9 axillary. Recheck at 2227 is 94.3 oral and 93.9 axillary. Pt feels warmer and pt has no complaints. Confirm temp with multiple thermometers. Orders received to place more warm blankets and recheck temp.

## 2019-12-27 NOTE — NURSING
Notified Dr. Sushma of low urine output and lab results. Orders received to increase continuous fluid of LR to 100ml/hr.

## 2019-12-27 NOTE — NURSING
Notified Dr. Alvarado of pts temp of 97.3. Pt request for bear hugger to be removed. Orders received for bear hugger to be removed at this time.

## 2019-12-27 NOTE — NURSING
Notified Dr. Alvarado of pts low temp of 94.3 oral and 94.7 rectal. Pt has no complaints, pt is warm, coloring normal. Orders receive to place bear hugger on pt at this time.

## 2019-12-27 NOTE — PLAN OF CARE
COPIED FROM INFANT'S CHART      12/27/19 1011   Discharge Assessment   Assessment Type Discharge Planning Assessment   Confirmed/corrected address and phone number on facesheet? Yes   Assessment information obtained from? Caregiver  (mom)   Is patient able to care for self after discharge? Patient is of pediatric age;No   Discharge Plan A Home with family;WIC   Patient/Family in Agreement with Plan yes      Sw met with mom in moms room. Sw explained the role of the sw. Mom was easily engaged. Mom verified demographics. Mom reported that she does not have the needed items for pt (car seat, crib, etc). Mom intends on using formula and is linked with [facility]. Pts pediatrician is unknown. Sw encouraged mom to select soon and inform bedside RN. Mom expressed that she has support and parents are in a relationship. Mom has Medicaid. No needs reported.       Resources given: Medicaid transportation      Education: Information given on CPR classes; Physician/NNP daily rounds; and Postpartum Depression signs.     Alex Lowe, [facility] [place]  [facility]   Phone [phone] Ext. [phone]  [email or web address]

## 2019-12-27 NOTE — CARE UPDATE
On lab review, noted Cr 1.7. Patient having intermittent mild range pressures. Has positive P/c ratio. Denies other severe features.     Elevated pressure, proteinuria and abnormal Cr meets diagnosis of PreE w/SF. Will start renally dosed Mag with q6 hr Mag levels. Repeat CMP in AM.     Wes Alvarado MD  PGY-4 OB/GYN  [phone]    Discussed plan with staff who was in agreement.

## 2019-12-27 NOTE — PLAN OF CARE
COPIED FROM INFANT'S CHART      12/27/19 1005   Discharge Assessment   Assessment Type Discharge Planning Assessment   Confirmed/corrected address and phone number on facesheet? Yes   Assessment information obtained from? Caregiver  (mom)   Is patient able to care for self after discharge? Patient is of pediatric age;No   Discharge Plan A Home with family;WIC   Patient/Family in Agreement with Plan yes      Sw met with mom in moms room. Sw explained the role of the sw. Mom was easily engaged. Mom verified demographics. Mom reported that she does not have the needed items for pt (car seat, crib, etc). Mom intends on using formula and is linked with [facility]. Pts pediatrician is unknown. Sw encouraged mom to select soon and inform bedside RN. Mom expressed that she has support and parents are in a relationship. Mom has Medicaid. No needs reported.       Alex Lowe, [facility] [place]  NICU   Phone [phone] Ext. [phone]  [email or web address]

## 2019-12-28 LAB
ALBUMIN SERPL BCP-MCNC: 1.3 G/DL (ref 3.5–5.2)
ALP SERPL-CCNC: 134 U/L (ref 55–135)
ALT SERPL W/O P-5'-P-CCNC: 6 U/L (ref 10–44)
ANION GAP SERPL CALC-SCNC: 5 MMOL/L (ref 8–16)
AST SERPL-CCNC: 12 U/L (ref 10–40)
BILIRUB SERPL-MCNC: 0.1 MG/DL (ref 0.1–1)
BUN SERPL-MCNC: 31 MG/DL (ref 6–20)
CALCIUM SERPL-MCNC: 7.7 MG/DL (ref 8.7–10.5)
CHLORIDE SERPL-SCNC: 107 MMOL/L (ref 95–110)
CO2 SERPL-SCNC: 22 MMOL/L (ref 23–29)
CREAT SERPL-MCNC: 1.8 MG/DL (ref 0.5–1.4)
EST. GFR  (AFRICAN AMERICAN): 45 ML/MIN/1.73 M^2
EST. GFR  (NON AFRICAN AMERICAN): 39 ML/MIN/1.73 M^2
GLUCOSE SERPL-MCNC: 73 MG/DL (ref 70–110)
POTASSIUM SERPL-SCNC: 3.1 MMOL/L (ref 3.5–5.1)
PROT SERPL-MCNC: 4.8 G/DL (ref 6–8.4)
SODIUM SERPL-SCNC: 134 MMOL/L (ref 136–145)

## 2019-12-28 PROCEDURE — 36415 COLL VENOUS BLD VENIPUNCTURE: CPT

## 2019-12-28 PROCEDURE — 63600175 PHARM REV CODE 636 W HCPCS: Performed by: STUDENT IN AN ORGANIZED HEALTH CARE EDUCATION/TRAINING PROGRAM

## 2019-12-28 PROCEDURE — 99231 SBSQ HOSP IP/OBS SF/LOW 25: CPT | Mod: ,,, | Performed by: OBSTETRICS & GYNECOLOGY

## 2019-12-28 PROCEDURE — 25000003 PHARM REV CODE 250: Performed by: STUDENT IN AN ORGANIZED HEALTH CARE EDUCATION/TRAINING PROGRAM

## 2019-12-28 PROCEDURE — 11000001 HC ACUTE MED/SURG PRIVATE ROOM

## 2019-12-28 PROCEDURE — 99231 PR SUBSEQUENT HOSPITAL CARE,LEVL I: ICD-10-PCS | Mod: ,,, | Performed by: OBSTETRICS & GYNECOLOGY

## 2019-12-28 PROCEDURE — 80053 COMPREHEN METABOLIC PANEL: CPT

## 2019-12-28 RX ORDER — POTASSIUM CHLORIDE 20 MEQ/1
60 TABLET, EXTENDED RELEASE ORAL ONCE
Status: COMPLETED | OUTPATIENT
Start: 2019-12-28 | End: 2019-12-28

## 2019-12-28 RX ORDER — OXYCODONE HYDROCHLORIDE 5 MG/1
5 TABLET ORAL EVERY 6 HOURS PRN
Status: DISCONTINUED | OUTPATIENT
Start: 2019-12-28 | End: 2019-12-29

## 2019-12-28 RX ORDER — ENOXAPARIN SODIUM 100 MG/ML
40 INJECTION SUBCUTANEOUS EVERY 24 HOURS
Status: DISCONTINUED | OUTPATIENT
Start: 2019-12-28 | End: 2019-12-30 | Stop reason: HOSPADM

## 2019-12-28 RX ORDER — ACETAMINOPHEN 325 MG/1
650 TABLET ORAL EVERY 6 HOURS
Status: DISPENSED | OUTPATIENT
Start: 2019-12-28 | End: 2019-12-29

## 2019-12-28 RX ORDER — OXYCODONE HYDROCHLORIDE 5 MG/1
5 TABLET ORAL EVERY 6 HOURS PRN
Status: DISCONTINUED | OUTPATIENT
Start: 2019-12-28 | End: 2019-12-30 | Stop reason: HOSPADM

## 2019-12-28 RX ADMIN — OXYCODONE HYDROCHLORIDE 5 MG: 5 TABLET ORAL at 11:12

## 2019-12-28 RX ADMIN — SIMETHICONE CHEW TAB 80 MG 80 MG: 80 TABLET ORAL at 08:12

## 2019-12-28 RX ADMIN — ENOXAPARIN SODIUM 40 MG: 100 INJECTION SUBCUTANEOUS at 04:12

## 2019-12-28 RX ADMIN — FERROUS SULFATE TAB EC 325 MG (65 MG FE EQUIVALENT) 325 MG: 325 (65 FE) TABLET DELAYED RESPONSE at 09:12

## 2019-12-28 RX ADMIN — ACETAMINOPHEN 650 MG: 325 TABLET ORAL at 06:12

## 2019-12-28 RX ADMIN — POTASSIUM CHLORIDE 60 MEQ: 1500 TABLET, EXTENDED RELEASE ORAL at 06:12

## 2019-12-28 RX ADMIN — DOCUSATE SODIUM 200 MG: 100 CAPSULE, LIQUID FILLED ORAL at 08:12

## 2019-12-28 RX ADMIN — PRENATAL VIT W/ FE FUMARATE-FA TAB 27-0.8 MG 1 TABLET: 27-0.8 TAB at 09:12

## 2019-12-28 RX ADMIN — ACETAMINOPHEN 650 MG: 325 TABLET ORAL at 07:12

## 2019-12-28 RX ADMIN — CEFTRIAXONE 1 G: 1 INJECTION, SOLUTION INTRAVENOUS at 09:12

## 2019-12-28 RX ADMIN — DOCUSATE SODIUM 200 MG: 100 CAPSULE, LIQUID FILLED ORAL at 09:12

## 2019-12-28 NOTE — PROGRESS NOTES
MD notified of pt's temp of 95.8 axillary after multiple failed attempts at an oral reading. Pt feels warm to the touch. VSS otherwise. MD also updated on pt's low but adequate urinary output, slight improvement. Previous order of 500ml LR bolus is still infusing. No new orders at this time. MD plans to round on pt soon. Will continue to monitor.

## 2019-12-28 NOTE — NURSING
Received verbal order from Dr. Figueredo to discontinue jorgensen catheter and maintain strict intake and output.

## 2019-12-28 NOTE — PROGRESS NOTES
POSTPARTUM PROGRESS NOTE     Miguel Angel Moreno is a 24 y.o. female POD #2 status post Primary  section at 34w2d in a pregnancy complicated by complicated by Di/Di twin IUP with breech presentation and SROM on admission. She was also diagnosed with a Ecoli UTI on UCx and was started on treatment with this admission. Patient is doing well this morning. She denies nausea, vomiting, fever or chills.  She denies HA, SOB, CP, RUQ pain.     Patient reports mild abdominal pain that is adequately relieved by oral pain medications. Lochia is mild to moderate  and stable. Patient still has jorgensen catheter in place and has not been ambulating. She has passed flatus.  Patient does plan to breast feed. Still deciding on contraception.     Objective:       Temp:  [94.2 °F (34.6 °C)-97.4 °F (36.3 °C)] 95.8 °F (35.4 °C)  Pulse:  [48-75] 71  Resp:  [18] 18  SpO2:  [95 %-99 %] 99 %  BP: (101-131)/(61-78) 101/68    General:   alert, appears stated age and cooperative   Lungs:   Non-labored respirations, Lungs CTAB   Heart:   regular rate and rhythm   Abdomen:  Soft, nondistended, No RUQ pain   Uterus:  firm located at the umblicus, no fundal tenderness    Incision: No erythema or drainage   Extremities: no pedal edema noted, 2+ reflexes bilateralluy     Lab Review  Recent Labs   Lab 19  1910 19  0429   WBC 20.25* 25.05*   HGB 10.0* 9.4*   HCT 31.7* 28.9*   MCV 82 82    228        Recent Labs   Lab 19  0428 19  0708 19  0933 19   * 134* 134* 134*   K 3.7 3.6 4.0 3.9    104 104 104   CO2 22* 21* 21* 22*   BUN 20 21* 22* 26*   CREATININE 1.8* 2.0* 2.0* 2.0*   GLU 82 97 81 71   PROT 5.6* 5.4* 5.6* 5.3*   BILITOT 0.4 0.3 0.3 0.3   ALKPHOS 175* 167* 183* 145*   ALT 9* 6* 7* 5*   AST 16 14 15 13   MG 3.5* 3.3* 3.3*  --           I/O    Intake/Output Summary (Last 24 hours) at 2019 0714  Last data filed at 2019 0507  Gross per 24 hour   Intake 2180 ml   Output 740 ml    Net 1440 ml        Assessment:     Patient Active Problem List   Diagnosis    Dichorionic diamniotic twin pregnancy    Limited prenatal care in third trimester    Transient hypertension of pregnancy in third trimester    Pelvic pain in pregnancy, antepartum, third trimester    Hypokalemia due to inadequate potassium intake    Anemia in pregnancy, third trimester    Proteinuria affecting pregnancy in third trimester    S/P emergency  section (di-di twins breech SROM@34 weeks)    Pregnancy, supervision, high-risk        Plan:   1. Postpartum care:  - Patient doing well. Continue routine management and advances.  - Continue PO pain meds. Pain well controlled.  - Heme: H/h 10/32 >>> pending  - Encourage ambulation  - Circumcision - infants in NICU  - Contraception - still deciding  - Lactation consult PRN    2. PreE with SF (kidney function/ Cr)  - BP: (101-131)/(61-78) 101/68  - Patient received 1 bolus of Mg SO4. Second bolus held per RN.  - Mag level never therapeutic.  - Plt 239, AST/ALT /16  Cr 1.7 (increased from baseline 1 week prior)  - Mag levels, CBC, and CMP q6 hours- repeat pending this morning  - Lactic Acid wnl    3. Decreased UOP  - UOP starting to respond to IV fluids  - On 150 cc/hr NS, has gotten multiple 500 cc boluses  - Last Cr was 2.0, up from 1.7  - Holding NSAIDs, continuing to hydrate  - Repeat CMP this morning      4. Ecoli UTI  - Rocephin while inpatient.    Dispo: As patient meets milestones, will plan to discharge POD #3-4.      Yuko Dela Cruz MD  OBGYN PGY-2    I have reviewed and concur with the resident's history, physical assessment and plan.  I have personally interviewed and examined the patient at bedside. Will stop all nephrotoxic medications - stop lovenox and transition to heparin. Continue to monitor closely.     Madhavi Titus MD  Obstetrics and Gynecology  [facility]

## 2019-12-28 NOTE — NURSING
Received verbal order from Dr. Hernandez to discontinue IV fluids at this time, to leave jorgensen catheter in place and continue to monitor intake and output.  Encouraged PO fluids, pt verbalized understanding.

## 2019-12-28 NOTE — PROGRESS NOTES
Notified Dr. Dela Cruz that pt Magnesium had not been administered. MD states that since pt is 24 hours postpartum, discontinue the order. Pt cleared to be q4 vitals and strict I&O. Orders read back and verified. LEANDRO Dowell RN updated on pt plan of care.

## 2019-12-28 NOTE — PROGRESS NOTES
MD to bedside for PM assessment. Patient sleeping comfortably but easily arousable. She reports no complaints at this time. States that she feels well, no pain. Minimal bleeding. No chills, chest pain, difficulty breathing. RN also at bedside, states UOP was 45 cc in the last hour.     Temp:  [94.2 °F (34.6 °C)-97.4 °F (36.3 °C)] 95.8 °F (35.4 °C)  Pulse:  [48-84] 75  Resp:  [18] 18  SpO2:  [95 %-98 %] 98 %  BP: ()/(58-78) 111/66    Patient warm to the touch. Fundus firm and at the umbilicus. Bleeding minimal. Normal respiratory effort. Minimal pedal edema.    Plan:  - IV fluids currently running at 150 cc/hr.  - Continue close monitoring of UOP. Slight improvement this evening compared with earlier in the day, still inadequate.  - Repeat CMP in AM.  - Will hold NSAIDs at this time given DANIEL. Schedule tylenol with oxy IR PRN.      Yuko Dela Cruz MD  OBGYN PGY-2

## 2019-12-29 LAB
ALBUMIN SERPL BCP-MCNC: 1.5 G/DL (ref 3.5–5.2)
ALP SERPL-CCNC: 131 U/L (ref 55–135)
ALT SERPL W/O P-5'-P-CCNC: 8 U/L (ref 10–44)
ANION GAP SERPL CALC-SCNC: 9 MMOL/L (ref 8–16)
AST SERPL-CCNC: 16 U/L (ref 10–40)
BILIRUB SERPL-MCNC: 0.3 MG/DL (ref 0.1–1)
BUN SERPL-MCNC: 26 MG/DL (ref 6–20)
CALCIUM SERPL-MCNC: 8.1 MG/DL (ref 8.7–10.5)
CHLORIDE SERPL-SCNC: 111 MMOL/L (ref 95–110)
CO2 SERPL-SCNC: 20 MMOL/L (ref 23–29)
CREAT SERPL-MCNC: 1.4 MG/DL (ref 0.5–1.4)
EST. GFR  (AFRICAN AMERICAN): >60 ML/MIN/1.73 M^2
EST. GFR  (NON AFRICAN AMERICAN): 53 ML/MIN/1.73 M^2
GLUCOSE SERPL-MCNC: 82 MG/DL (ref 70–110)
POTASSIUM SERPL-SCNC: 3.7 MMOL/L (ref 3.5–5.1)
PROT SERPL-MCNC: 5.3 G/DL (ref 6–8.4)
SODIUM SERPL-SCNC: 140 MMOL/L (ref 136–145)

## 2019-12-29 PROCEDURE — 80053 COMPREHEN METABOLIC PANEL: CPT

## 2019-12-29 PROCEDURE — 99238 HOSP IP/OBS DSCHRG MGMT 30/<: CPT | Mod: TH,,, | Performed by: OBSTETRICS & GYNECOLOGY

## 2019-12-29 PROCEDURE — 11000001 HC ACUTE MED/SURG PRIVATE ROOM

## 2019-12-29 PROCEDURE — 25000003 PHARM REV CODE 250: Performed by: STUDENT IN AN ORGANIZED HEALTH CARE EDUCATION/TRAINING PROGRAM

## 2019-12-29 PROCEDURE — 36415 COLL VENOUS BLD VENIPUNCTURE: CPT

## 2019-12-29 PROCEDURE — 63600175 PHARM REV CODE 636 W HCPCS: Performed by: STUDENT IN AN ORGANIZED HEALTH CARE EDUCATION/TRAINING PROGRAM

## 2019-12-29 PROCEDURE — 99238 PR HOSPITAL DISCHARGE DAY,<30 MIN: ICD-10-PCS | Mod: TH,,, | Performed by: OBSTETRICS & GYNECOLOGY

## 2019-12-29 RX ORDER — OXYCODONE HYDROCHLORIDE 5 MG/1
5 TABLET ORAL EVERY 6 HOURS PRN
Qty: 30 TABLET | Refills: 0 | Status: SHIPPED | OUTPATIENT
Start: 2019-12-29

## 2019-12-29 RX ORDER — OXYCODONE HYDROCHLORIDE 5 MG/1
10 TABLET ORAL EVERY 6 HOURS PRN
Status: DISCONTINUED | OUTPATIENT
Start: 2019-12-29 | End: 2019-12-30 | Stop reason: HOSPADM

## 2019-12-29 RX ORDER — AMOXICILLIN 250 MG
1 CAPSULE ORAL NIGHTLY PRN
Qty: 30 TABLET | Refills: 1 | Status: ON HOLD | OUTPATIENT
Start: 2019-12-29 | End: 2020-12-15 | Stop reason: HOSPADM

## 2019-12-29 RX ORDER — ACETAMINOPHEN 325 MG/1
650 TABLET ORAL EVERY 6 HOURS PRN
Status: DISCONTINUED | OUTPATIENT
Start: 2019-12-29 | End: 2019-12-30 | Stop reason: HOSPADM

## 2019-12-29 RX ADMIN — ACETAMINOPHEN 650 MG: 325 TABLET ORAL at 03:12

## 2019-12-29 RX ADMIN — OXYCODONE HYDROCHLORIDE 5 MG: 5 TABLET ORAL at 09:12

## 2019-12-29 RX ADMIN — PRENATAL VIT W/ FE FUMARATE-FA TAB 27-0.8 MG 1 TABLET: 27-0.8 TAB at 09:12

## 2019-12-29 RX ADMIN — ENOXAPARIN SODIUM 40 MG: 100 INJECTION SUBCUTANEOUS at 05:12

## 2019-12-29 RX ADMIN — ACETAMINOPHEN 650 MG: 325 TABLET ORAL at 12:12

## 2019-12-29 RX ADMIN — OXYCODONE HYDROCHLORIDE 5 MG: 5 TABLET ORAL at 12:12

## 2019-12-29 RX ADMIN — DOCUSATE SODIUM 200 MG: 100 CAPSULE, LIQUID FILLED ORAL at 09:12

## 2019-12-29 RX ADMIN — OXYCODONE HYDROCHLORIDE 10 MG: 5 TABLET ORAL at 05:12

## 2019-12-29 RX ADMIN — DOCUSATE SODIUM 200 MG: 100 CAPSULE, LIQUID FILLED ORAL at 08:12

## 2019-12-29 RX ADMIN — FERROUS SULFATE TAB EC 325 MG (65 MG FE EQUIVALENT) 325 MG: 325 (65 FE) TABLET DELAYED RESPONSE at 09:12

## 2019-12-29 RX ADMIN — SIMETHICONE CHEW TAB 80 MG 80 MG: 80 TABLET ORAL at 08:12

## 2019-12-29 RX ADMIN — CEFTRIAXONE 1 G: 1 INJECTION, SOLUTION INTRAVENOUS at 09:12

## 2019-12-29 RX ADMIN — OXYCODONE HYDROCHLORIDE 10 MG: 5 TABLET ORAL at 09:12

## 2019-12-29 RX ADMIN — ACETAMINOPHEN 650 MG: 325 TABLET ORAL at 09:12

## 2019-12-29 NOTE — NURSING
VS stable.  Pt ambulated in room without assistance once this shift.  Pt did not visit infant twins in NICU this shift but does speak positively of them.  Pt voiding and passing flatus.  Pt tolerating PO well and there are no s/s of distress at this time.  Pt's pain well controlled with oral pain medication.  Pt's bleeding has been light throughout shift and fundus is firm.  Will continue to monitor.

## 2019-12-29 NOTE — PROGRESS NOTES
POSTPARTUM PROGRESS NOTE     Miguel Angel Moreno is a 24 y.o. female POD #3 status post Primary  section at 34w2d in a pregnancy complicated by complicated by Di/Di twin IUP with breech presentation and SROM on admission. She was also diagnosed with a Ecoli UTI on UCx and was started on treatment with this admission. Patient is doing well this morning. She denies nausea, vomiting, fever or chills.  She denies HA, SOB, CP, RUQ pain.     Patient reports mild abdominal pain that is adequately relieved by oral pain medications. Lochia is mild to moderate  and stable. Patient is voiding spontaneously and ambulating in the room. She has passed flatus.  Patient does plan to breast feed. Still deciding on contraception.     Objective:       Temp:  [97.3 °F (36.3 °C)-98.2 °F (36.8 °C)] 97.9 °F (36.6 °C)  Pulse:  [76-97] 97  Resp:  [17-18] 18  SpO2:  [96 %-97 %] 96 %  BP: (101-116)/(60-69) 106/60    General:   alert, appears stated age and cooperative   Lungs:   Non-labored respirations, Lungs CTAB   Heart:   regular rate and rhythm   Abdomen:  Soft, nondistended, No RUQ pain   Uterus:  firm located at the umblicus, no fundal tenderness    Incision: No erythema or drainage   Extremities: no pedal edema noted, 2+ reflexes bilateralluy     Lab Review  Recent Labs   Lab 19  1910 19  0429   WBC 20.25* 25.05*   HGB 10.0* 9.4*   HCT 31.7* 28.9*   MCV 82 82    228        Recent Labs   Lab 19  0428 19  0708 19  0933 19  0653 19  0353   * 134* 134* 134* 134* 140   K 3.7 3.6 4.0 3.9 3.1* 3.7    104 104 104 107 111*   CO2 22* 21* 21* 22* 22* 20*   BUN 20 21* 22* 26* 31* 26*   CREATININE 1.8* 2.0* 2.0* 2.0* 1.8* 1.4   GLU 82 97 81 71 73 82   PROT 5.6* 5.4* 5.6* 5.3* 4.8* 5.3*   BILITOT 0.4 0.3 0.3 0.3 0.1 0.3   ALKPHOS 175* 167* 183* 145* 134 131   ALT 9* 6* 7* 5* 6* 8*   AST 16 14 15 13 12 16   MG 3.5* 3.3* 3.3*  --   --   --           I/O    Intake/Output  Summary (Last 24 hours) at 2019 0723  Last data filed at 2019 0430  Gross per 24 hour   Intake 2670 ml   Output 3010 ml   Net -340 ml        Assessment:     Patient Active Problem List   Diagnosis    Dichorionic diamniotic twin pregnancy    Limited prenatal care in third trimester    Transient hypertension of pregnancy in third trimester    Pelvic pain in pregnancy, antepartum, third trimester    Hypokalemia due to inadequate potassium intake    Anemia in pregnancy, third trimester    Proteinuria affecting pregnancy in third trimester    S/P emergency  section (di-di twins breech SROM@34 weeks)    Pregnancy, supervision, high-risk        Plan:   1. Postpartum care:  - Patient doing well. Continue routine management and advances.  - Continue PO pain meds. Pain well controlled.  - Heme: H/h 10/32 >>> 9.4/28.9  - Encourage ambulation  - Circumcision - infants in NICU  - Contraception - still deciding  - Lactation consult PRN    2. PreE with SF (kidney function/ Cr)  - BP: (101-131)/(61-78) 101/68  - Patient received 1 bolus of Mg SO4. Second bolus held per RN.  - Mag level never therapeutic.  - Plt 239, AST/ALT   Cr 1.7> 1.4 this AM  - Mag levels, CBC, and CMP q6 hours- repeat pending this morning  - Lactic Acid wnl    3. Decreased UOP  - Resolved  - > 3 L out in past 24 hrs  - UOP starting to respond to IV fluids      4. Ecoli UTI  - Rocephin while inpatient.    Dispo: As patient meets milestones, will plan to discharge POD #3-4.      Raul Figueredo M.D.  PGY-3 OB/GYN  [facility]

## 2019-12-30 VITALS
RESPIRATION RATE: 18 BRPM | SYSTOLIC BLOOD PRESSURE: 123 MMHG | BODY MASS INDEX: 34.58 KG/M2 | DIASTOLIC BLOOD PRESSURE: 72 MMHG | WEIGHT: 183 LBS | OXYGEN SATURATION: 99 % | TEMPERATURE: 98 F | HEART RATE: 96 BPM

## 2019-12-30 PROCEDURE — 25000003 PHARM REV CODE 250: Performed by: STUDENT IN AN ORGANIZED HEALTH CARE EDUCATION/TRAINING PROGRAM

## 2019-12-30 PROCEDURE — 63600175 PHARM REV CODE 636 W HCPCS: Performed by: STUDENT IN AN ORGANIZED HEALTH CARE EDUCATION/TRAINING PROGRAM

## 2019-12-30 RX ADMIN — OXYCODONE HYDROCHLORIDE 10 MG: 5 TABLET ORAL at 04:12

## 2019-12-30 RX ADMIN — ACETAMINOPHEN 650 MG: 325 TABLET ORAL at 04:12

## 2019-12-30 RX ADMIN — CEFTRIAXONE 1 G: 1 INJECTION, SOLUTION INTRAVENOUS at 08:12

## 2019-12-30 RX ADMIN — DOCUSATE SODIUM 200 MG: 100 CAPSULE, LIQUID FILLED ORAL at 08:12

## 2019-12-30 RX ADMIN — OXYCODONE HYDROCHLORIDE 10 MG: 5 TABLET ORAL at 11:12

## 2019-12-30 RX ADMIN — FERROUS SULFATE TAB EC 325 MG (65 MG FE EQUIVALENT) 325 MG: 325 (65 FE) TABLET DELAYED RESPONSE at 08:12

## 2019-12-30 NOTE — PROGRESS NOTES
RN reviewed d/c instructions with pt-pt stated understanding.BP follow up appt in 1 week.  Follow up appt in 6 weeks to be made with OB. Pt received prescriptions/ meds. Stable condition. Ok to d/c home today

## 2019-12-30 NOTE — PROGRESS NOTES
POSTPARTUM PROGRESS NOTE     Miguel Angel Moreno is a 24 y.o. female POD #4 status post Primary  section at 34w2d in a pregnancy complicated by complicated by Di/Di twin IUP with breech presentation and SROM on admission. She was also diagnosed with a Ecoli UTI on UCx and was started on treatment with this admission. Patient is doing well this morning. She denies nausea, vomiting, fever or chills.  She denies HA, SOB, CP, RUQ pain.     Patient reports mild abdominal pain that is adequately relieved by oral pain medications. Lochia is mild to moderate  and stable. Patient is voiding spontaneously and ambulating in the room. She has passed flatus.  Patient does plan to breast feed. Still deciding on contraception.     Objective:       Temp:  [97.3 °F (36.3 °C)-98.3 °F (36.8 °C)] 98.3 °F (36.8 °C)  Pulse:  [78-94] 94  Resp:  [18] 18  SpO2:  [96 %-99 %] 98 %  BP: (111-136)/(53-85) 136/84    General:   alert, appears stated age and cooperative   Lungs:   Non-labored respirations,    Heart:   regular rate and rhythm   Abdomen:  Soft, nondistended, No RUQ pain   Uterus:  firm located at the umblicus, no fundal tenderness    Incision: No erythema or drainage   Extremities: no pedal edema noted,      Lab Review  Recent Labs   Lab 19  1910 19  0429   WBC 20.25* 25.05*   HGB 10.0* 9.4*   HCT 31.7* 28.9*   MCV 82 82    228        Recent Labs   Lab 19  0428 19  0708 19  0933 19  0653 19  0353   * 134* 134* 134* 134* 140   K 3.7 3.6 4.0 3.9 3.1* 3.7    104 104 104 107 111*   CO2 22* 21* 21* 22* 22* 20*   BUN 20 21* 22* 26* 31* 26*   CREATININE 1.8* 2.0* 2.0* 2.0* 1.8* 1.4   GLU 82 97 81 71 73 82   PROT 5.6* 5.4* 5.6* 5.3* 4.8* 5.3*   BILITOT 0.4 0.3 0.3 0.3 0.1 0.3   ALKPHOS 175* 167* 183* 145* 134 131   ALT 9* 6* 7* 5* 6* 8*   AST 16 14 15 13 12 16   MG 3.5* 3.3* 3.3*  --   --   --           I/O    Intake/Output Summary (Last 24 hours) at 2019  0620  Last data filed at 2019 0500  Gross per 24 hour   Intake 1900 ml   Output 4250 ml   Net -2350 ml        Assessment:     Patient Active Problem List   Diagnosis    Dichorionic diamniotic twin pregnancy    Limited prenatal care in third trimester    Transient hypertension of pregnancy in third trimester    Pelvic pain in pregnancy, antepartum, third trimester    Hypokalemia due to inadequate potassium intake    Anemia in pregnancy, third trimester    Proteinuria affecting pregnancy in third trimester    S/P emergency  section (di-di twins breech SROM@34 weeks)    Pregnancy, supervision, high-risk        Plan:   1. Postpartum care:  - Patient doing well. Continue routine management and advances.  - Continue PO pain meds. Pain well controlled.  - Heme: H/h 10/32 >>> 9.4/28.9  - Encourage ambulation  - Circumcision - infants in NICU  - Contraception - still deciding- will discuss at 6 week pp visit.   - Lactation consult PRN    2. PreE with SF (kidney function/ Cr)  - BP: (111-136)/(53-85) 136/84  - BP well controlled   - Patient received 1 bolus of Mg SO4. Second bolus held per RN.  - Mag level never therapeutic.  - Plt 239, AST/ALT /16  Cr 1.7> 1.4 yesterday AM  - Lactic Acid wnl    3. Decreased UOP  - Resolved    4. Ecoli UTI  - Rocephin while inpatient.  - DC home with macrobid    5. Obesity and twin pregnancy  - Patient has been on lovenox throughout hospital stay. Planning to dc at discharge    Dispo: As patient meets milestones, will plan to discharge POD #3-4.      Beckie Soto M.D.   OB/GYN  PGY-1

## 2019-12-30 NOTE — DISCHARGE SUMMARY
Delivery Discharge Summary  Obstetrics      Primary OB Clinician: ANN Merrill MD     Admission date: 2019  Discharge date: 2019    Disposition: To home, self care    Discharge Diagnosis List:      Patient Active Problem List   Diagnosis    Dichorionic diamniotic twin pregnancy    Limited prenatal care in third trimester    Transient hypertension of pregnancy in third trimester    Pelvic pain in pregnancy, antepartum, third trimester    Hypokalemia due to inadequate potassium intake    Anemia in pregnancy, third trimester    Proteinuria affecting pregnancy in third trimester    S/P emergency  section (di-di twins breech SROM@34 weeks)    Pregnancy, supervision, high-risk       Procedure: , due to  labor, twin A with breech parts palpated in vagina    Hospital Course:  Miguel Angel Moreno is a 24 y.o. now , POD #4 who was admitted on 2019 at 34w2d for PROM. Patient was subsequently admitted to labor and delivery unit with signed consents.     Labor course was complicated by breech presentation of twin A on setting of PROM, and decision was made to proceed with delivery via emergent  which was performed without complications.    Please see delivery note for further details. Her postpartum course was complicated by pre E with SF (Cr). On discharge day, patient's pain is controlled with oral pain medications. Pt is tolerating ambulation without SOB or CP, and regular diet without N/V. Reports lochia is mild. Denies any HA, vision changes, F/C, LE swelling. Denies any breast pain/soreness.    Pt in stable condition and ready for discharge. She has been instructed to start and/or continue medications and follow up with her obstetrics provider as listed below.    Pertinent studies:  CBC  Recent Labs   Lab 19  1910 19   WBC 20.25* 25.05*   HGB 10.0* 9.4*   HCT 31.7* 28.9*   MCV 82 82    228        Recent Labs   Lab 19  0425  19  0708 19  0933 19  0653 19  0353   * 134* 134* 134* 134* 140   K 3.7 3.6 4.0 3.9 3.1* 3.7    104 104 104 107 111*   CO2 22* 21* 21* 22* 22* 20*   BUN 20 21* 22* 26* 31* 26*   CREATININE 1.8* 2.0* 2.0* 2.0* 1.8* 1.4   GLU 82 97 81 71 73 82   PROT 5.6* 5.4* 5.6* 5.3* 4.8* 5.3*   BILITOT 0.4 0.3 0.3 0.3 0.1 0.3   ALKPHOS 175* 167* 183* 145* 134 131   ALT 9* 6* 7* 5* 6* 8*   AST 16 14 15 13 12 16   MG 3.5* 3.3* 3.3*  --   --   --         Immunization History   Administered Date(s) Administered    DTaP 1999    Hepatitis B, Adult 2016    Hepatitis B, Pediatric/Adolescent 1999    Influenza - Quadrivalent - PF (6 months and older) 2019    MMR 1999, 2016    OPV 1999    PPD Test 2016, 2016    Tdap 2014, 2019    Varicella 1999           NICK Moreno [[id number]]     Delivery:    Episiotomy: None   Lacerations: None   Repair suture:     Repair # of packets: 7   Blood loss (ml): 0     Birth information:  YOB: 2019   Time of birth: 3:40 PM   Sex: male   Delivery type: , Low Transverse   Gestational Age: 34w2d    Delivery Clinician:      Other providers:       Additional  information:  Forceps:    Vacuum:    Breech:    Observed anomalies      Living?:           APGARS  One minute Five minutes Ten minutes   Skin color:         Heart rate:         Grimace:         Muscle tone:         Breathing:         Totals: 8  9        Placenta: Delivered:       appearance     HAJA Moreno [[id number]]     Delivery:    Episiotomy: None   Lacerations: None   Repair suture:     Repair # of packets: 7   Blood loss (ml): 0     Birth information:  YOB: 2019   Time of birth: 3:41 PM   Sex: male   Delivery type: , Low Transverse   Gestational Age: 34w2d    Delivery Clinician:      Other providers:       Additional  information:  Forceps:    Vacuum:     Breech:    Observed anomalies      Living?:           APGARS  One minute Five minutes Ten minutes   Skin color:         Heart rate:         Grimace:         Muscle tone:         Breathing:         Totals: 8  9        Placenta: Delivered:       appearance      Patient Instructions:   Current Discharge Medication List      START taking these medications    Details   oxyCODONE (ROXICODONE) 5 MG immediate release tablet Take 1 tablet (5 mg total) by mouth every 6 (six) hours as needed.  Qty: 30 tablet, Refills: 0    Comments: Quantity prescribed more than 7 day supply? Yes, quantity medically necessary      senna-docusate 8.6-50 mg (PERICOLACE) 8.6-50 mg per tablet Take 1 tablet by mouth nightly as needed for Constipation.  Qty: 30 tablet, Refills: 1         CONTINUE these medications which have NOT CHANGED    Details   ferrous sulfate (FEOSOL) 325 mg (65 mg iron) Tab tablet Take 1 tablet (325 mg total) by mouth once daily.  Qty: 30 tablet, Refills: 0         STOP taking these medications       aspirin (ECOTRIN) 81 MG EC tablet Comments:   Reason for Stopping:         folic acid (FOLVITE) 1 MG tablet Comments:   Reason for Stopping:         nitrofurantoin, macrocrystal-monohydrate, (MACROBID) 100 MG capsule Comments:   Reason for Stopping:         ondansetron (ZOFRAN) 4 MG tablet Comments:   Reason for Stopping:               No discharge procedures on file.    Follow-up Information     [facility] In 1 week.    Specialty:  Obstetrics and Gynecology  Why:  Blood pressure check  Contact information:  [address] [place] [address] [phone]           [facility] In 6 weeks.    Specialty:  Obstetrics and Gynecology  Why:  Postpartum visit  Contact information:  [address] [place] [address] [phone]                  Beckie Soto M.D.   OB/GYN  PGY-1

## 2020-01-06 LAB
ALLENS TEST: ABNORMAL
ALLENS TEST: ABNORMAL
DELSYS: ABNORMAL
DELSYS: ABNORMAL
FINAL PATHOLOGIC DIAGNOSIS: NORMAL
GROSS: NORMAL
HCO3 UR-SCNC: 23.4 MMOL/L (ref 24–28)
HCO3 UR-SCNC: 26.2 MMOL/L (ref 24–28)
MODE: ABNORMAL
MODE: ABNORMAL
PCO2 BLDA: 51.4 MMHG (ref 35–45)
PCO2 BLDA: 60.8 MMHG (ref 35–45)
PH SMN: 7.24 [PH] (ref 7.35–7.45)
PH SMN: 7.27 [PH] (ref 7.35–7.45)
PO2 BLDA: 12 MMHG (ref 80–100)
PO2 BLDA: 7 MMHG (ref 80–100)
POC BE: -1 MMOL/L
POC BE: -4 MMOL/L
POC SATURATED O2: 10 % (ref 95–100)
POC SATURATED O2: 4 % (ref 95–100)
SAMPLE: ABNORMAL
SAMPLE: ABNORMAL
SITE: ABNORMAL
SITE: ABNORMAL

## 2020-01-07 ENCOUNTER — TELEPHONE (OUTPATIENT)
Dept: OBSTETRICS AND GYNECOLOGY | Facility: CLINIC | Age: 25
End: 2020-01-07

## 2020-01-07 NOTE — TELEPHONE ENCOUNTER
----- Message from Smith Evans sent at 1/7/2020  8:29 AM CST -----  Pp pt states that she needs something for pain. Pt can be reached at [phone].

## 2020-01-10 ENCOUNTER — TELEPHONE (OUTPATIENT)
Dept: OBSTETRICS AND GYNECOLOGY | Facility: CLINIC | Age: 25
End: 2020-01-10

## 2020-01-10 NOTE — TELEPHONE ENCOUNTER
Returned call. Informed pt that Dr. Douglas is not in clinic today. Pt requested a Rx refill and stated that she can wait until Monday. Forwarded message to Dr. Douglas.

## 2020-01-10 NOTE — TELEPHONE ENCOUNTER
----- Message from Jairo Cool sent at 1/10/2020  3:40 PM CST -----  Contact: BROOKLYN DRAPER [[id number]]      Can the clinic reply in [facility]: no      Please refill the medication(s) listed below. Please call the patient when the prescription(s) is ready for  at this phone number   [phone]        Medication #1 oxyCODONE (ROXICODONE) 5 MG immediate release tablet      Preferred Pharmacy: [facility] [address] [place] [address]

## 2020-01-13 ENCOUNTER — TELEPHONE (OUTPATIENT)
Dept: OBSTETRICS AND GYNECOLOGY | Facility: CLINIC | Age: 25
End: 2020-01-13

## 2020-01-13 NOTE — TELEPHONE ENCOUNTER
----- Message from Irlanda Castillo LPN sent at 1/10/2020  4:15 PM CST -----  Pt understands that you're not in the clinic on fridays. Pt stated that she can wait until Monday.        Jairo COLUNGA Staff  Caller: BROOKLYN DRAPER [[id number]] (Today,  3:40 PM)     Can the clinic reply in [facility]: no       Please refill the medication(s) listed below. Please call the patient when the prescription(s) is ready for  at this phone number   [phone]         Medication #1 oxyCODONE (ROXICODONE) 5 MG immediate release tablet       Preferred Pharmacy: [facility] [address]

## 2020-01-13 NOTE — TELEPHONE ENCOUNTER
----- Message from Smith Evans sent at 1/13/2020  8:53 AM CST -----  Pp pt needs letter to go back to work. Pt can be reached at [phone].

## 2020-03-03 ENCOUNTER — TELEPHONE (OUTPATIENT)
Dept: OBSTETRICS AND GYNECOLOGY | Facility: CLINIC | Age: 25
End: 2020-03-03

## 2020-03-03 NOTE — TELEPHONE ENCOUNTER
----- Message from Smith Evans sent at 3/3/2020  8:31 AM CST -----  Pp pt needs a letter to go back to work. Pt can be reached at [phone].

## 2020-03-03 NOTE — TELEPHONE ENCOUNTER
Returned call and instructed pt to come to PP appt today for RTW note. Pt stated that she's having transportation issues but she'll try to make it to her appt.

## 2020-03-23 ENCOUNTER — TELEPHONE (OUTPATIENT)
Dept: OBSTETRICS AND GYNECOLOGY | Facility: CLINIC | Age: 25
End: 2020-03-23

## 2020-03-23 PROBLEM — O13.3 TRANSIENT HYPERTENSION OF PREGNANCY IN THIRD TRIMESTER: Status: RESOLVED | Noted: 2019-12-19 | Resolved: 2020-03-23

## 2020-03-23 NOTE — TELEPHONE ENCOUNTER
----- Message from Smith Evans sent at 3/23/2020 11:02 AM CDT -----  Pp pt need letter to returning to work. Pt can be reached at [phone].

## 2020-03-26 NOTE — TELEPHONE ENCOUNTER
Spoke to patient. Informed patient that return to work letter is available for  at the [facility].

## 2020-04-16 ENCOUNTER — TELEPHONE (OUTPATIENT)
Dept: OBSTETRICS AND GYNECOLOGY | Facility: CLINIC | Age: 25
End: 2020-04-16

## 2020-04-16 PROBLEM — O09.33 LIMITED PRENATAL CARE IN THIRD TRIMESTER: Status: RESOLVED | Noted: 2019-12-19 | Resolved: 2020-04-16

## 2020-04-16 PROBLEM — O99.013 ANEMIA IN PREGNANCY, THIRD TRIMESTER: Status: RESOLVED | Noted: 2019-12-19 | Resolved: 2020-04-16

## 2020-04-16 PROBLEM — O09.90 PREGNANCY, SUPERVISION, HIGH-RISK: Status: RESOLVED | Noted: 2019-12-26 | Resolved: 2020-04-16

## 2020-04-16 PROBLEM — O26.893 PELVIC PAIN IN PREGNANCY, ANTEPARTUM, THIRD TRIMESTER: Status: RESOLVED | Noted: 2019-12-19 | Resolved: 2020-04-16

## 2020-04-16 PROBLEM — Z98.891 S/P EMERGENCY CESAREAN SECTION: Status: RESOLVED | Noted: 2019-12-26 | Resolved: 2020-04-16

## 2020-04-16 PROBLEM — O12.13 PROTEINURIA AFFECTING PREGNANCY IN THIRD TRIMESTER: Status: RESOLVED | Noted: 2019-12-19 | Resolved: 2020-04-16

## 2020-04-16 PROBLEM — R10.2 PELVIC PAIN IN PREGNANCY, ANTEPARTUM, THIRD TRIMESTER: Status: RESOLVED | Noted: 2019-12-19 | Resolved: 2020-04-16

## 2020-04-16 PROBLEM — O30.049 DICHORIONIC DIAMNIOTIC TWIN PREGNANCY: Status: RESOLVED | Noted: 2019-08-06 | Resolved: 2020-04-16

## 2020-04-16 PROBLEM — E87.6 HYPOKALEMIA DUE TO INADEQUATE POTASSIUM INTAKE: Status: RESOLVED | Noted: 2019-12-19 | Resolved: 2020-04-16

## 2020-04-16 NOTE — TELEPHONE ENCOUNTER
----- Message from Irlanda Castillo LPN sent at 4/16/2020  3:07 PM CDT -----  Contact: pt      ----- Message -----  From: Kourtney Real  Sent: 4/16/2020   2:53 PM CDT  To: Misael COLUNGA Staff    Name of Who is Calling: Miguel Angel Moreno      What is the request in detail: pt would like to speak with staff in regards to getting information on terminating a pregnancy. Please contact to further discuss and advise.      Can the clinic reply by [facility]: n      What Number to Call Back if not in [facility]: [phone]

## 2020-04-21 DIAGNOSIS — Z01.84 ANTIBODY RESPONSE EXAMINATION: ICD-10-CM

## 2020-04-28 ENCOUNTER — TELEPHONE (OUTPATIENT)
Dept: OBSTETRICS AND GYNECOLOGY | Facility: CLINIC | Age: 25
End: 2020-04-28

## 2020-04-28 NOTE — TELEPHONE ENCOUNTER
----- Message from Bob Giraldo sent at 4/28/2020  2:51 PM CDT -----  Contact: Patient [phone]  Patient would like to get medical advice.    Comments: Patient calling would like to get a call back to discuss personally private previous coversation with office regarding pregnancy.    Please call an advise  Thank you

## 2020-04-28 NOTE — TELEPHONE ENCOUNTER
----- Message from Yoselin Talley sent at 2020 11:41 AM CDT -----  Contact: BROOKLYN DRAPER [[id number]]  Name of Who is Calling: BROOKLYN DRAPER [[id number]]    What is the request in detail: Would like to speak with Dr. Douglas in regards to positive pregnancy test. Patient states she is considering an  at this time. Please contact to further discuss and advise      Can the clinic reply by [facility]: no    What Number to Call Back [facility] not [facility]: [phone]

## 2020-04-28 NOTE — TELEPHONE ENCOUNTER
That's the only place in [facility].  There are other places in [place]

## 2020-04-28 NOTE — TELEPHONE ENCOUNTER
Spoke with patient, she has a positive pregnancy test but does not want to go through with pregnancy. Patient received recommendation for women's clinic but she states they haven't answered and would like advice from .    Informed patient  is out at the moment, shall return to clinic tomorrow, she will be able to reach her by then if I can't get back to her before the day is over.    Patient expressed understanding, has no further questions.

## 2020-04-29 ENCOUNTER — TELEPHONE (OUTPATIENT)
Dept: OBSTETRICS AND GYNECOLOGY | Facility: CLINIC | Age: 25
End: 2020-04-29

## 2020-04-29 NOTE — TELEPHONE ENCOUNTER
Returned call and informed pt that it's the only place in [facility].  There are other places in [place] per Dr. Douglas. Pt voiced understanding.

## 2020-04-29 NOTE — TELEPHONE ENCOUNTER
----- Message from Jane Lee sent at 4/29/2020  3:29 PM CDT -----  Contact: patient  Patient is returning your call.      Patient Can be reached at [phone]

## 2020-04-29 NOTE — TELEPHONE ENCOUNTER
Called pt to inform her that it's the only place in [facility].  There are other places in [place] per Dr. Douglas. No answer. Left VM message.

## 2020-05-21 DIAGNOSIS — Z01.84 ANTIBODY RESPONSE EXAMINATION: ICD-10-CM

## 2020-05-29 ENCOUNTER — TELEPHONE (OUTPATIENT)
Dept: OBSTETRICS AND GYNECOLOGY | Facility: CLINIC | Age: 25
End: 2020-05-29

## 2020-05-29 NOTE — TELEPHONE ENCOUNTER
----- Message from Bella Agosto, Patient Care Assistant sent at 5/29/2020 12:28 PM CDT -----  Contact: BROOKLYN DRAPER [[id number]]  Name of Who is Calling: BROOKLYN DRAPER [[id number]]    What is the request in detail:Requesting a call back in regards of a new pregnancy, stating she not trying to go through with pregnancy.  Please contact to further discuss and advise      Can the clinic reply by [facility]: No    [facility]:   [phone]

## 2020-05-29 NOTE — TELEPHONE ENCOUNTER
Returned call. Pt stated that she thought about it and she still wants to terminate her pregnancy. Informed pt that we do not offer those services at [facility]. List of resources provided. Pt voiced understanding.

## 2020-06-20 DIAGNOSIS — Z01.84 ANTIBODY RESPONSE EXAMINATION: ICD-10-CM

## 2020-07-20 DIAGNOSIS — Z01.84 ANTIBODY RESPONSE EXAMINATION: ICD-10-CM

## 2020-07-22 ENCOUNTER — TELEPHONE (OUTPATIENT)
Dept: OBSTETRICS AND GYNECOLOGY | Facility: CLINIC | Age: 25
End: 2020-07-22

## 2020-07-22 NOTE — TELEPHONE ENCOUNTER
----- Message from Yoselin Talley sent at 7/22/2020 10:33 AM CDT -----  Name of Who is Calling: BROOKLYN DRAPER [[id number]]    What is the request in detail: Would like to speak with staff to reschedule pregnancy confirmation appointment. Please contact to further discuss and advise      Can the clinic reply by [facility]: no    [facility]: [phone]

## 2020-07-29 ENCOUNTER — ROUTINE PRENATAL (OUTPATIENT)
Dept: OBSTETRICS AND GYNECOLOGY | Facility: CLINIC | Age: 25
End: 2020-07-29
Payer: MEDICAID

## 2020-07-29 ENCOUNTER — APPOINTMENT (OUTPATIENT)
Dept: LAB | Facility: HOSPITAL | Age: 25
End: 2020-07-29
Attending: ADVANCED PRACTICE MIDWIFE
Payer: MEDICAID

## 2020-07-29 VITALS — SYSTOLIC BLOOD PRESSURE: 130 MMHG | BODY MASS INDEX: 30.2 KG/M2 | DIASTOLIC BLOOD PRESSURE: 80 MMHG | WEIGHT: 159.81 LBS

## 2020-07-29 DIAGNOSIS — O09.299 HX OF PRE-ECLAMPSIA IN PRIOR PREGNANCY, CURRENTLY PREGNANT: ICD-10-CM

## 2020-07-29 DIAGNOSIS — O09.899 SUPERVISION OF OTHER HIGH RISK PREGNANCY, ANTEPARTUM, UNSPECIFIED TRIMESTER: Primary | ICD-10-CM

## 2020-07-29 LAB
ABO + RH BLD: NORMAL
ALBUMIN SERPL BCP-MCNC: 3 G/DL (ref 3.5–5.2)
ALP SERPL-CCNC: 54 U/L (ref 55–135)
ALT SERPL W/O P-5'-P-CCNC: 8 U/L (ref 10–44)
ANION GAP SERPL CALC-SCNC: 6 MMOL/L (ref 8–16)
AST SERPL-CCNC: 15 U/L (ref 10–40)
BASOPHILS # BLD AUTO: 0.02 K/UL (ref 0–0.2)
BASOPHILS NFR BLD: 0.2 % (ref 0–1.9)
BILIRUB SERPL-MCNC: 0.2 MG/DL (ref 0.1–1)
BLD GP AB SCN CELLS X3 SERPL QL: NORMAL
BUN SERPL-MCNC: 11 MG/DL (ref 6–20)
CALCIUM SERPL-MCNC: 8.6 MG/DL (ref 8.7–10.5)
CHLORIDE SERPL-SCNC: 106 MMOL/L (ref 95–110)
CO2 SERPL-SCNC: 26 MMOL/L (ref 23–29)
CREAT SERPL-MCNC: 0.7 MG/DL (ref 0.5–1.4)
DIFFERENTIAL METHOD: ABNORMAL
EOSINOPHIL # BLD AUTO: 0.2 K/UL (ref 0–0.5)
EOSINOPHIL NFR BLD: 1.9 % (ref 0–8)
ERYTHROCYTE [DISTWIDTH] IN BLOOD BY AUTOMATED COUNT: 14.2 % (ref 11.5–14.5)
EST. GFR  (AFRICAN AMERICAN): >60 ML/MIN/1.73 M^2
EST. GFR  (NON AFRICAN AMERICAN): >60 ML/MIN/1.73 M^2
GLUCOSE SERPL-MCNC: 67 MG/DL (ref 70–110)
HCT VFR BLD AUTO: 34.8 % (ref 37–48.5)
HGB BLD-MCNC: 10.6 G/DL (ref 12–16)
IMM GRANULOCYTES # BLD AUTO: 0.06 K/UL (ref 0–0.04)
IMM GRANULOCYTES NFR BLD AUTO: 0.5 % (ref 0–0.5)
LYMPHOCYTES # BLD AUTO: 3.2 K/UL (ref 1–4.8)
LYMPHOCYTES NFR BLD: 28.1 % (ref 18–48)
MCH RBC QN AUTO: 27.5 PG (ref 27–31)
MCHC RBC AUTO-ENTMCNC: 30.5 G/DL (ref 32–36)
MCV RBC AUTO: 90 FL (ref 82–98)
MONOCYTES # BLD AUTO: 0.9 K/UL (ref 0.3–1)
MONOCYTES NFR BLD: 7.6 % (ref 4–15)
NEUTROPHILS # BLD AUTO: 6.9 K/UL (ref 1.8–7.7)
NEUTROPHILS NFR BLD: 61.7 % (ref 38–73)
NRBC BLD-RTO: 0 /100 WBC
PLATELET # BLD AUTO: 332 K/UL (ref 150–350)
PMV BLD AUTO: 10.3 FL (ref 9.2–12.9)
POTASSIUM SERPL-SCNC: 3.6 MMOL/L (ref 3.5–5.1)
PROT SERPL-MCNC: 6.9 G/DL (ref 6–8.4)
RBC # BLD AUTO: 3.85 M/UL (ref 4–5.4)
SODIUM SERPL-SCNC: 138 MMOL/L (ref 136–145)
WBC # BLD AUTO: 11.25 K/UL (ref 3.9–12.7)

## 2020-07-29 PROCEDURE — 99213 OFFICE O/P EST LOW 20 MIN: CPT | Mod: PBBFAC,TH,PO,25 | Performed by: ADVANCED PRACTICE MIDWIFE

## 2020-07-29 PROCEDURE — 86762 RUBELLA ANTIBODY: CPT

## 2020-07-29 PROCEDURE — 86803 HEPATITIS C AB TEST: CPT

## 2020-07-29 PROCEDURE — 85025 COMPLETE CBC W/AUTO DIFF WBC: CPT

## 2020-07-29 PROCEDURE — 86592 SYPHILIS TEST NON-TREP QUAL: CPT

## 2020-07-29 PROCEDURE — 86787 VARICELLA-ZOSTER ANTIBODY: CPT

## 2020-07-29 PROCEDURE — 87491 CHLMYD TRACH DNA AMP PROBE: CPT

## 2020-07-29 PROCEDURE — 80053 COMPREHEN METABOLIC PANEL: CPT

## 2020-07-29 PROCEDURE — 99999 PR PBB SHADOW E&M-EST. PATIENT-LVL III: CPT | Mod: PBBFAC,,, | Performed by: ADVANCED PRACTICE MIDWIFE

## 2020-07-29 PROCEDURE — 99214 OFFICE O/P EST MOD 30 MIN: CPT | Mod: TH,S$PBB,, | Performed by: ADVANCED PRACTICE MIDWIFE

## 2020-07-29 PROCEDURE — 99999 PR PBB SHADOW E&M-EST. PATIENT-LVL III: ICD-10-PCS | Mod: PBBFAC,,, | Performed by: ADVANCED PRACTICE MIDWIFE

## 2020-07-29 PROCEDURE — 87340 HEPATITIS B SURFACE AG IA: CPT

## 2020-07-29 PROCEDURE — 87088 URINE BACTERIA CULTURE: CPT

## 2020-07-29 PROCEDURE — 83020 HEMOGLOBIN ELECTROPHORESIS: CPT

## 2020-07-29 PROCEDURE — 99214 PR OFFICE/OUTPT VISIT, EST, LEVL IV, 30-39 MIN: ICD-10-PCS | Mod: TH,S$PBB,, | Performed by: ADVANCED PRACTICE MIDWIFE

## 2020-07-29 PROCEDURE — 86850 RBC ANTIBODY SCREEN: CPT

## 2020-07-29 PROCEDURE — 88175 CYTOPATH C/V AUTO FLUID REDO: CPT

## 2020-07-29 PROCEDURE — 86703 HIV-1/HIV-2 1 RESULT ANTBDY: CPT

## 2020-07-29 PROCEDURE — 87086 URINE CULTURE/COLONY COUNT: CPT

## 2020-07-29 PROCEDURE — 87077 CULTURE AEROBIC IDENTIFY: CPT

## 2020-07-29 PROCEDURE — 87186 SC STD MICRODIL/AGAR DIL: CPT

## 2020-07-29 PROCEDURE — 36415 COLL VENOUS BLD VENIPUNCTURE: CPT | Mod: PO

## 2020-07-29 RX ORDER — ASPIRIN 81 MG/1
81 TABLET ORAL DAILY
Qty: 30 TABLET | Refills: 8 | Status: SHIPPED | OUTPATIENT
Start: 2020-07-29 | End: 2020-11-25

## 2020-07-29 RX ORDER — SWAB
1 SWAB, NON-MEDICATED MISCELLANEOUS DAILY
Qty: 30 TABLET | Refills: 9 | Status: SHIPPED | OUTPATIENT
Start: 2020-07-29 | End: 2021-05-25

## 2020-07-29 NOTE — PROGRESS NOTES
Reason for visit: Routine Prenatal Visit (LMP: 2020)      HPI:    25 y.o. female  presents for confirmation of pregnancy or to establish OB care. This pregnancy is complicated by short pregnancy interval (last delivery in 2019; twins) and hx LTCS. This was an unplanned, now desired pregnnacy. Pt desires BTL. She also is interested in TOLAC. Not yet taking PNV. Previous pregnancy was complicated by Pre E w/ SF (Cr).    New patient: n    Patient's last menstrual period was 2020 (exact date).    Nausea:  n  Vomiting: n  Cramping: n  Bleeding:  n    Denies family history of bleeding disorders, birth defects, or mental disability  Denies any complications with prior pregnancy     Desires genetic screening    Contraception: None  Pap: No result found       History reviewed. No pertinent past medical history.  Past Surgical History:   Procedure Laterality Date     SECTION Left 2019    Procedure:  SECTION;  Surgeon: Ana Laura Douglas MD;  Location: [facility] L&D;  Service: OB/GYN;  Laterality: Left;       Social History     Tobacco Use    Smoking status: Never Smoker   Substance Use Topics    Alcohol use: No     Family History   Problem Relation Age of Onset    Hypertension Maternal Grandmother     Diabetes Maternal Grandmother     Breast cancer Neg Hx     Colon cancer Neg Hx     Ovarian cancer Neg Hx      OB History    Para Term  AB Living   4 3 2 1   4   SAB TAB Ectopic Multiple Live Births         1 4      # Outcome Date GA Lbr Eric/2nd Weight Sex Delivery Anes PTL Lv   4 Current            3A  19 34w2d  1.88 kg (4 lb 2.3 oz) M CS-LTranv Spinal Y WILEY   3B  19 34w2d  2 kg (4 lb 6.6 oz) M CS-LTranv Spinal Y WILEY   2 Term 02/15/18 40w0d  3.09 kg (6 lb 13 oz) M Vag-Spont  N WILEY   1 Term 14 39w1d  3.075 kg (6 lb 12.5 oz) M Vag-Spont Spinal, EPI N WILEY      Complications: Nuchal cord       Medications: Reviewed with patient and updated in  EMR.  Allergies: Tree nut       Review of Systems   Constitutional: Negative for fever.   Respiratory: Negative for shortness of breath.    Cardiovascular: Negative for chest pain.   Gastrointestinal: Negative for abdominal pain, nausea and vomiting.   Endocrine: Negative for hot flashes.   Genitourinary: Negative for menstrual problem.   Integumentary:  Negative for breast mass, nipple discharge and breast skin changes.   Neurological: Negative for headaches.   Hematological: Does not bruise/bleed easily.   Psychiatric/Behavioral: Negative for depression.   Breast: Negative for mass, mastodynia, nipple discharge and skin changes        Vitals: /80   Wt 72.5 kg (159 lb 13.3 oz)   LMP 03/08/2020 (Exact Date)   BMI 30.20 kg/m²     Physical Exam  Constitutional:       General: She is not in acute distress.     Appearance: Normal appearance. She is well-developed.   Genitourinary:      Vulva, urethra, bladder, vagina and cervix normal.      No vulval lesion noted.      Bladder is not tender.      No vaginal discharge.      Cervix is parous.      Uterus is enlarged.      Uterus is not tender.      Uterus is regular.      Right adnexa not tender or full.      Left adnexa not tender or full.   HENT:      Head: Normocephalic and atraumatic.   Neck:      Musculoskeletal: Normal range of motion and neck supple.      Thyroid: No thyroid mass or thyromegaly.   Pulmonary:      Effort: Pulmonary effort is normal. No respiratory distress.   Chest:      Breasts: Breasts are symmetrical.         Right: No mass, nipple discharge, skin change or tenderness.         Left: No mass, nipple discharge, skin change or tenderness.   Abdominal:      General: There is no distension.      Palpations: Abdomen is soft. There is no hepatomegaly, splenomegaly or mass.      Tenderness: There is no abdominal tenderness.      Hernia: No hernia is present.   Musculoskeletal:      Right lower leg: No edema.      Left lower leg: No edema.    Lymphadenopathy:      Cervical: No cervical adenopathy.      Upper Body:      Right upper body: No axillary adenopathy.      Left upper body: No axillary adenopathy.      Lower Body: No right inguinal adenopathy. No left inguinal adenopathy.   Neurological:      Mental Status: She is alert and oriented to person, place, and time.   Skin:     Findings: No rash.   Psychiatric:         Mood and Affect: Mood and affect normal.           Assessment and Plan:     Supervision of other high risk pregnancy, antepartum, unspecified trimester  -     CBC auto differential  -     C. trachomatis/N. gonorrhoeae by AMP DNA [facility]; Cervicovaginal  -     Hemoglobin Electrophoresis,Hgb A2 [place].  -     Hepatitis B Surface Antigen  -     Hepatitis C Antibody; Future; Expected date: 07/29/2020  -     HIV 1/2 Ag/Ab (4th Gen)  -     Liquid-Based Pap Smear, Screening  -     RPR  -     Rubella Antibody, IgG  -     Type & Screen  -     Urine culture  -     US MFM Procedure (Viewpoint); Future  -     Varicella Zoster Antibody, IgG  -     prenatal vit-iron fum-folic ac 28 mg iron- 800 mcg Tab; Take 1 tablet by mouth once daily.  Dispense: 30 tablet; Refill: 9    Hx of pre-eclampsia in prior pregnancy, currently pregnant  -     Comprehensive metabolic panel  -     Protein / creatinine ratio, urine  -     aspirin (ECOTRIN) 81 MG EC tablet; Take 1 tablet (81 mg total) by mouth once daily.  Dispense: 30 tablet; Refill: 8  -     EKG 12-lead       UPT positive  o Dating  - LMP=3/8/20 --> NOLBERTO=12/13/20 --> EGA=20w3d  - Dating u/s and anatomy scheduled  o Initial prenatal labs  o Aneuploidy screening: desires. Given EGA, will wait for dates from anatomy, then likely NIPT  o PNV ordered   Start b ASA (late start)       NEW PREGNANCY COUNSELING  Patient was counseled today on:  - Routine prenatal blood tests including HIV and anticipated course of prenatal care  - Prenatal vitamins and folic acid  - Weight gain, nutrition, and exercise  - Seafood and  mercury  - Properly heating deli and prepared meats and avoiding unrefrigerated deli  meats, cheeses, and milk products,   - Avoiding cat litter and raw meats due to risk of Toxoplasmosis precautions   - Accuracy of the LMP-based NOLBERTO and the value of an early TV-u/s  - OTC medication in the first trimester  - Harmful effects of smoking, etOH, and recreational drugs  - [facility] u/s  at 18-20 weeks.  - Common complaints of pregnancy  - Seat belt use  - Childbirth classes and hospital facilities  - All questions were answered    - Pain and bleeding precautions given    - Return to clinic in 2 weeks    Total visit time was 30 minutes with greater than 50% of time dedicated to counseling.    LEANDRO Duncan MD  OBGYN PGY2

## 2020-07-29 NOTE — PROGRESS NOTES
I have seen the patient, reviewed the Resident's assessment, plan and progress note. I have personally interviewed and examined the patient at bedside and: agree with the findings.  Kylie STEPHENSON

## 2020-07-29 NOTE — LETTER
July 29, 2020      [facility]  [address] [place] [address]  Phone: [phone]       Patient: Miguel Angel Moreno   YOB: 1995  Date of Visit: 07/29/2020    To Whom It May Concern:    Greg Moreno  was at [facility] on 07/29/2020. She may return to work/school on 8/1/2020 with no restrictions. If you have any questions or concerns, or if I can be of further assistance, please do not hesitate to contact me.    Sincerely,      LEANDRO Duncan MD

## 2020-07-30 LAB
HBV SURFACE AG SERPL QL IA: NEGATIVE
HCV AB SERPL QL IA: NEGATIVE
HGB A2 MFR BLD HPLC: 2.5 % (ref 2.2–3.2)
HGB FRACT BLD ELPH-IMP: NORMAL
HGB FRACT BLD ELPH-IMP: NORMAL
HIV 1+2 AB+HIV1 P24 AG SERPL QL IA: NEGATIVE
RPR SER QL: NORMAL
RUBV IGG SER-ACNC: 34.8 IU/ML
RUBV IGG SER-IMP: REACTIVE

## 2020-07-31 ENCOUNTER — PATIENT MESSAGE (OUTPATIENT)
Dept: OBSTETRICS AND GYNECOLOGY | Facility: HOSPITAL | Age: 25
End: 2020-07-31

## 2020-07-31 LAB
BACTERIA UR CULT: ABNORMAL
VARICELLA INTERPRETATION: POSITIVE
VARICELLA ZOSTER IGG: 3.49 ISR (ref 0–0.9)

## 2020-07-31 RX ORDER — NITROFURANTOIN 25; 75 MG/1; MG/1
100 CAPSULE ORAL 2 TIMES DAILY
Qty: 14 CAPSULE | Refills: 0 | Status: SHIPPED | OUTPATIENT
Start: 2020-07-31 | End: 2020-08-07

## 2020-08-01 LAB
C TRACH DNA SPEC QL NAA+PROBE: NOT DETECTED
N GONORRHOEA DNA SPEC QL NAA+PROBE: NOT DETECTED

## 2020-08-04 ENCOUNTER — PROCEDURE VISIT (OUTPATIENT)
Dept: MATERNAL FETAL MEDICINE | Facility: CLINIC | Age: 25
End: 2020-08-04
Payer: MEDICAID

## 2020-08-04 DIAGNOSIS — O09.899 SUPERVISION OF OTHER HIGH RISK PREGNANCY, ANTEPARTUM, UNSPECIFIED TRIMESTER: ICD-10-CM

## 2020-08-04 DIAGNOSIS — Z36.89 ENCOUNTER FOR FETAL ANATOMIC SURVEY: ICD-10-CM

## 2020-08-04 PROCEDURE — 76805 PR US, OB 14+WKS, TRANSABD, SINGLE GESTATION: ICD-10-PCS | Mod: 26,S$PBB,, | Performed by: OBSTETRICS & GYNECOLOGY

## 2020-08-04 PROCEDURE — 76805 OB US >/= 14 WKS SNGL FETUS: CPT | Mod: 26,S$PBB,, | Performed by: OBSTETRICS & GYNECOLOGY

## 2020-08-04 PROCEDURE — 76805 OB US >/= 14 WKS SNGL FETUS: CPT | Mod: PBBFAC | Performed by: OBSTETRICS & GYNECOLOGY

## 2020-08-13 LAB
FINAL PATHOLOGIC DIAGNOSIS: NORMAL
Lab: NORMAL

## 2020-08-19 DIAGNOSIS — Z01.84 ANTIBODY RESPONSE EXAMINATION: ICD-10-CM

## 2020-09-18 DIAGNOSIS — Z01.84 ANTIBODY RESPONSE EXAMINATION: ICD-10-CM

## 2020-10-18 DIAGNOSIS — Z01.84 ANTIBODY RESPONSE EXAMINATION: ICD-10-CM

## 2020-11-10 ENCOUNTER — ROUTINE PRENATAL (OUTPATIENT)
Dept: OBSTETRICS AND GYNECOLOGY | Facility: CLINIC | Age: 25
End: 2020-11-10
Payer: MEDICAID

## 2020-11-10 VITALS
WEIGHT: 171.94 LBS | DIASTOLIC BLOOD PRESSURE: 72 MMHG | BODY MASS INDEX: 32.49 KG/M2 | SYSTOLIC BLOOD PRESSURE: 110 MMHG

## 2020-11-10 DIAGNOSIS — Z98.891 HISTORY OF CESAREAN DELIVERY: ICD-10-CM

## 2020-11-10 DIAGNOSIS — Z3A.35 35 WEEKS GESTATION OF PREGNANCY: Primary | ICD-10-CM

## 2020-11-10 PROBLEM — O09.30 INSUFFICIENT ANTEPARTUM CARE: Status: ACTIVE | Noted: 2019-12-19

## 2020-11-10 PROCEDURE — 99213 OFFICE O/P EST LOW 20 MIN: CPT | Mod: TH,S$PBB,, | Performed by: ADVANCED PRACTICE MIDWIFE

## 2020-11-10 PROCEDURE — 99212 OFFICE O/P EST SF 10 MIN: CPT | Mod: PBBFAC,PN | Performed by: ADVANCED PRACTICE MIDWIFE

## 2020-11-10 PROCEDURE — 99213 PR OFFICE/OUTPT VISIT, EST, LEVL III, 20-29 MIN: ICD-10-PCS | Mod: TH,S$PBB,, | Performed by: ADVANCED PRACTICE MIDWIFE

## 2020-11-10 PROCEDURE — 99999 PR PBB SHADOW E&M-EST. PATIENT-LVL II: CPT | Mod: PBBFAC,,, | Performed by: ADVANCED PRACTICE MIDWIFE

## 2020-11-10 PROCEDURE — 99999 PR PBB SHADOW E&M-EST. PATIENT-LVL II: ICD-10-PCS | Mod: PBBFAC,,, | Performed by: ADVANCED PRACTICE MIDWIFE

## 2020-11-10 NOTE — PROGRESS NOTES
Reason for visit: Routine Prenatal Visit    HPI:   25 y.o., at 34w6d by Estimated Date of Delivery: 20    In with no c/o    ROS:  OBSTETRICS  - Contractions: denies  - Bleeding: denies  - Loss of fluid: denies  - Fetal movement: reports good FM, reinforced BID  GASTRO  - Nausea: done  - Vomiting: done  NEURO  - Headache: done      Past medical, surgical, social, family, and OB history: Reviewed and updated in EMR.  Medications: Reviewed and updated in EMR.  Allergies: Tree nut   Pregnancy dating, labs, ultrasound reports, prenatal testing, and problem list: Reviewed and updated in EMR.    gravida4 Problems (from 20 to present)     No problems associated with this episode.            Vitals: /72   Wt 78 kg (171 lb 15.3 oz)   LMP 2020 (Exact Date)   BMI 32.49 kg/m²     Physical exam:  GENERAL: No acute distress, normal appearance  NECK: Supple, normal ROM  SKIN: No rashes  NEURO: Alert and oriented x3  PSYCH: Normal mood and affect  CARDIO: Trace bilateral LE edema  PULMONARY: Normal respiratory effort; no respiratory distress  ABD: Soft, gravid, nontender; no hernia or hepatosplenomegaly    Assessment and Plan:    35 weeks gestation of pregnancy  -     Strep B Screen, Vaginal / Rectal    History of  delivery        GBS done today- needs labs next visit   labor precautions given  Follow-up: 1 weeks

## 2020-11-12 ENCOUNTER — PATIENT MESSAGE (OUTPATIENT)
Dept: OBSTETRICS AND GYNECOLOGY | Facility: CLINIC | Age: 25
End: 2020-11-12

## 2020-11-17 DIAGNOSIS — Z01.84 ANTIBODY RESPONSE EXAMINATION: ICD-10-CM

## 2020-11-25 ENCOUNTER — PROCEDURE VISIT (OUTPATIENT)
Dept: OBSTETRICS AND GYNECOLOGY | Facility: CLINIC | Age: 25
End: 2020-11-25
Payer: MEDICAID

## 2020-11-25 ENCOUNTER — ROUTINE PRENATAL (OUTPATIENT)
Dept: OBSTETRICS AND GYNECOLOGY | Facility: CLINIC | Age: 25
End: 2020-11-25
Payer: MEDICAID

## 2020-11-25 VITALS
BODY MASS INDEX: 32.99 KG/M2 | SYSTOLIC BLOOD PRESSURE: 102 MMHG | DIASTOLIC BLOOD PRESSURE: 80 MMHG | WEIGHT: 174.63 LBS

## 2020-11-25 DIAGNOSIS — Z3A.37 37 WEEKS GESTATION OF PREGNANCY: ICD-10-CM

## 2020-11-25 DIAGNOSIS — Z3A.37 37 WEEKS GESTATION OF PREGNANCY: Primary | ICD-10-CM

## 2020-11-25 LAB
BASOPHILS # BLD AUTO: 0.01 K/UL (ref 0–0.2)
BASOPHILS NFR BLD: 0.1 % (ref 0–1.9)
DIFFERENTIAL METHOD: ABNORMAL
EOSINOPHIL # BLD AUTO: 0 K/UL (ref 0–0.5)
EOSINOPHIL NFR BLD: 0.2 % (ref 0–8)
ERYTHROCYTE [DISTWIDTH] IN BLOOD BY AUTOMATED COUNT: 15.2 % (ref 11.5–14.5)
ESTIMATED AVG GLUCOSE: 94 MG/DL (ref 68–131)
HBA1C MFR BLD HPLC: 4.9 % (ref 4–5.6)
HCT VFR BLD AUTO: 32.4 % (ref 37–48.5)
HGB BLD-MCNC: 9.8 G/DL (ref 12–16)
IMM GRANULOCYTES # BLD AUTO: 0.12 K/UL (ref 0–0.04)
IMM GRANULOCYTES NFR BLD AUTO: 1.2 % (ref 0–0.5)
LYMPHOCYTES # BLD AUTO: 2.1 K/UL (ref 1–4.8)
LYMPHOCYTES NFR BLD: 20.8 % (ref 18–48)
MCH RBC QN AUTO: 24.6 PG (ref 27–31)
MCHC RBC AUTO-ENTMCNC: 30.2 G/DL (ref 32–36)
MCV RBC AUTO: 81 FL (ref 82–98)
MONOCYTES # BLD AUTO: 0.8 K/UL (ref 0.3–1)
MONOCYTES NFR BLD: 8.2 % (ref 4–15)
NEUTROPHILS # BLD AUTO: 7 K/UL (ref 1.8–7.7)
NEUTROPHILS NFR BLD: 69.5 % (ref 38–73)
NRBC BLD-RTO: 0 /100 WBC
PLATELET # BLD AUTO: 323 K/UL (ref 150–350)
PMV BLD AUTO: 10.1 FL (ref 9.2–12.9)
RBC # BLD AUTO: 3.98 M/UL (ref 4–5.4)
WBC # BLD AUTO: 10.11 K/UL (ref 3.9–12.7)

## 2020-11-25 PROCEDURE — 83036 HEMOGLOBIN GLYCOSYLATED A1C: CPT

## 2020-11-25 PROCEDURE — 86592 SYPHILIS TEST NON-TREP QUAL: CPT

## 2020-11-25 PROCEDURE — 85025 COMPLETE CBC W/AUTO DIFF WBC: CPT

## 2020-11-25 PROCEDURE — 99213 PR OFFICE/OUTPT VISIT, EST, LEVL III, 20-29 MIN: ICD-10-PCS | Mod: TH,S$PBB,, | Performed by: ADVANCED PRACTICE MIDWIFE

## 2020-11-25 PROCEDURE — 99212 OFFICE O/P EST SF 10 MIN: CPT | Mod: PBBFAC,PN | Performed by: ADVANCED PRACTICE MIDWIFE

## 2020-11-25 PROCEDURE — 99999 PR PBB SHADOW E&M-EST. PATIENT-LVL II: CPT | Mod: PBBFAC,,, | Performed by: ADVANCED PRACTICE MIDWIFE

## 2020-11-25 PROCEDURE — 86703 HIV-1/HIV-2 1 RESULT ANTBDY: CPT

## 2020-11-25 PROCEDURE — 99213 OFFICE O/P EST LOW 20 MIN: CPT | Mod: TH,S$PBB,, | Performed by: ADVANCED PRACTICE MIDWIFE

## 2020-11-25 PROCEDURE — 99999 PR PBB SHADOW E&M-EST. PATIENT-LVL II: ICD-10-PCS | Mod: PBBFAC,,, | Performed by: ADVANCED PRACTICE MIDWIFE

## 2020-11-25 PROCEDURE — 87081 CULTURE SCREEN ONLY: CPT

## 2020-11-26 LAB — RPR SER QL: NORMAL

## 2020-11-27 LAB — HIV 1+2 AB+HIV1 P24 AG SERPL QL IA: NEGATIVE

## 2020-11-27 NOTE — PROGRESS NOTES
Reason for visit: Routine Prenatal Visit    HPI:   25 y.o., at 37w2d by Estimated Date of Delivery: 20    In with no c/o    ROS:  OBSTETRICS  - Contractions: denies  - Bleeding: denies  - Loss of fluid: denies  - Fetal movement: reports good FM, reinforced BID  GASTRO  - Nausea: none  - Vomiting: none  NEURO  - Headache: none      Past medical, surgical, social, family, and OB history: Reviewed and updated in EMR.  Medications: Reviewed and updated in EMR.  Allergies: Tree nut   Pregnancy dating, labs, ultrasound reports, prenatal testing, and problem list: Reviewed and updated in EMR.    gravida4 Problems (from 20 to present)     No problems associated with this episode.            Vitals: /80   Wt 79.2 kg (174 lb 9.7 oz)   LMP 2020 (Exact Date)   BMI 32.99 kg/m²     Physical exam:  GENERAL: No acute distress, normal appearance  NECK: Supple, normal ROM  SKIN: No rashes  NEURO: Alert and oriented x3  PSYCH: Normal mood and affect  CARDIO: Trace bilateral LE edema  PULMONARY: Normal respiratory effort; no respiratory distress  ABD: Soft, gravid, nontender; no hernia or hepatosplenomegaly    Assessment and Plan:    37 weeks gestation of pregnancy  -     CBC Auto Differential; Future; Expected date: 2020  -     HIV 1/2 Ag/Ab (4th Gen); Future; Expected date: 2020  -     RPR; Future; Expected date: 2020  -     Strep B Screen, Vaginal / Rectal  -     Hemoglobin A1C; Future; Expected date: 2020          GBS/ labs done   labor precautions given  Follow-up: 1 weeks

## 2020-11-28 LAB — BACTERIA SPEC AEROBE CULT: NORMAL

## 2020-11-30 NOTE — PROGRESS NOTES
Lab Documentation    Ordering Physician: TL MILNER    Order Type: Written Order placed in epic    Patient in for lab visit only per provider treatment plan.

## 2020-12-09 ENCOUNTER — ROUTINE PRENATAL (OUTPATIENT)
Dept: OBSTETRICS AND GYNECOLOGY | Facility: CLINIC | Age: 25
End: 2020-12-09
Payer: MEDICAID

## 2020-12-09 VITALS — SYSTOLIC BLOOD PRESSURE: 120 MMHG | DIASTOLIC BLOOD PRESSURE: 80 MMHG | WEIGHT: 175.69 LBS | BODY MASS INDEX: 33.2 KG/M2

## 2020-12-09 DIAGNOSIS — Z37.9 NORMAL LABOR: Primary | ICD-10-CM

## 2020-12-09 DIAGNOSIS — Z34.83 ENCOUNTER FOR SUPERVISION OF OTHER NORMAL PREGNANCY IN THIRD TRIMESTER: ICD-10-CM

## 2020-12-09 PROCEDURE — 99999 PR PBB SHADOW E&M-EST. PATIENT-LVL II: CPT | Mod: PBBFAC,,, | Performed by: ADVANCED PRACTICE MIDWIFE

## 2020-12-09 PROCEDURE — 99213 OFFICE O/P EST LOW 20 MIN: CPT | Mod: TH,S$PBB,, | Performed by: ADVANCED PRACTICE MIDWIFE

## 2020-12-09 PROCEDURE — 99213 PR OFFICE/OUTPT VISIT, EST, LEVL III, 20-29 MIN: ICD-10-PCS | Mod: TH,S$PBB,, | Performed by: ADVANCED PRACTICE MIDWIFE

## 2020-12-09 PROCEDURE — 99212 OFFICE O/P EST SF 10 MIN: CPT | Mod: PBBFAC,TH,PN | Performed by: ADVANCED PRACTICE MIDWIFE

## 2020-12-09 PROCEDURE — 99999 PR PBB SHADOW E&M-EST. PATIENT-LVL II: ICD-10-PCS | Mod: PBBFAC,,, | Performed by: ADVANCED PRACTICE MIDWIFE

## 2020-12-09 RX ORDER — SODIUM CHLORIDE 9 MG/ML
INJECTION, SOLUTION INTRAVENOUS
Status: CANCELLED | OUTPATIENT
Start: 2020-12-09

## 2020-12-09 RX ORDER — OXYTOCIN/RINGER'S LACTATE 30/500 ML
95 PLASTIC BAG, INJECTION (ML) INTRAVENOUS ONCE
Status: CANCELLED | OUTPATIENT
Start: 2020-12-09 | End: 2020-12-09

## 2020-12-09 RX ORDER — CALCIUM CARBONATE 200(500)MG
500 TABLET,CHEWABLE ORAL 3 TIMES DAILY PRN
Status: CANCELLED | OUTPATIENT
Start: 2020-12-09

## 2020-12-09 RX ORDER — SIMETHICONE 80 MG
1 TABLET,CHEWABLE ORAL 4 TIMES DAILY PRN
Status: CANCELLED | OUTPATIENT
Start: 2020-12-09

## 2020-12-09 RX ORDER — SODIUM CHLORIDE, SODIUM LACTATE, POTASSIUM CHLORIDE, CALCIUM CHLORIDE 600; 310; 30; 20 MG/100ML; MG/100ML; MG/100ML; MG/100ML
INJECTION, SOLUTION INTRAVENOUS CONTINUOUS
Status: CANCELLED | OUTPATIENT
Start: 2020-12-09

## 2020-12-09 RX ORDER — OXYTOCIN/RINGER'S LACTATE 30/500 ML
334 PLASTIC BAG, INJECTION (ML) INTRAVENOUS ONCE
Status: CANCELLED | OUTPATIENT
Start: 2020-12-09 | End: 2020-12-09

## 2020-12-09 RX ORDER — ONDANSETRON 4 MG/1
8 TABLET, ORALLY DISINTEGRATING ORAL EVERY 8 HOURS PRN
Status: CANCELLED | OUTPATIENT
Start: 2020-12-09

## 2020-12-09 NOTE — PROGRESS NOTES
Reason for visit: Routine Prenatal Visit    HPI:   25 y.o., at 39w0d by Estimated Date of Delivery: 12/16/20    ROS:  OBSTETRICS  - Contractions: some back pain  - Bleeding: n  - Loss of fluid: n  - Fetal movement: y  GASTRO  - Nausea: n  - Vomiting: n  NEURO  - Headache: n      Past medical, surgical, social, family, and OB history: Reviewed and updated in EMR.  Medications: Reviewed and updated in EMR.  Allergies: Tree nut   Pregnancy dating, labs, ultrasound reports, prenatal testing, and problem list: Reviewed and updated in EMR.    gravida4 Problems (from 07/29/20 to present)     No problems associated with this episode.            Vitals: /80   Wt 79.7 kg (175 lb 11.3 oz)   LMP 03/08/2020 (Exact Date)   BMI 33.20 kg/m²     Physical exam:  GENERAL: No acute distress, normal appearance  NECK: Supple, normal ROM  SKIN: No rashes  NEURO: Alert and oriented x3  PSYCH: Normal mood and affect  CARDIO: Trace bilateral LE edema  PULMONARY: Normal respiratory effort; no respiratory distress  ABD: Soft, gravid, nontender; no hernia or hepatosplenomegaly    Assessment and Plan:    39 weeks gestation  -now desiring BTL, consented with medicaid consent today and discussed the 30 day window, can offer interval    Labor precautions given  Follow-up: 1 weeks    Janina De La Rosa MD  OBGYN PGY-2

## 2020-12-13 ENCOUNTER — HOSPITAL ENCOUNTER (INPATIENT)
Facility: HOSPITAL | Age: 25
LOS: 2 days | Discharge: HOME | End: 2020-12-15
Attending: OBSTETRICS & GYNECOLOGY | Admitting: OBSTETRICS & GYNECOLOGY
Payer: MEDICAID

## 2020-12-13 ENCOUNTER — ANESTHESIA (OUTPATIENT)
Dept: OBSTETRICS AND GYNECOLOGY | Facility: HOSPITAL | Age: 25
End: 2020-12-13
Payer: MEDICAID

## 2020-12-13 ENCOUNTER — ANESTHESIA EVENT (OUTPATIENT)
Dept: OBSTETRICS AND GYNECOLOGY | Facility: HOSPITAL | Age: 25
End: 2020-12-13
Payer: MEDICAID

## 2020-12-13 DIAGNOSIS — O42.90 LEAKAGE OF AMNIOTIC FLUID: ICD-10-CM

## 2020-12-13 DIAGNOSIS — Z37.9 NORMAL LABOR: ICD-10-CM

## 2020-12-13 LAB
ABO + RH BLD: NORMAL
ALBUMIN SERPL BCP-MCNC: 2.4 G/DL (ref 3.5–5.2)
ALP SERPL-CCNC: 102 U/L (ref 55–135)
ALT SERPL W/O P-5'-P-CCNC: 12 U/L (ref 10–44)
ANION GAP SERPL CALC-SCNC: 10 MMOL/L (ref 8–16)
AST SERPL-CCNC: 17 U/L (ref 10–40)
BASOPHILS # BLD AUTO: 0.01 K/UL (ref 0–0.2)
BASOPHILS NFR BLD: 0.1 % (ref 0–1.9)
BILIRUB SERPL-MCNC: 0.3 MG/DL (ref 0.1–1)
BLD GP AB SCN CELLS X3 SERPL QL: NORMAL
BUN SERPL-MCNC: 8 MG/DL (ref 6–20)
CALCIUM SERPL-MCNC: 7.9 MG/DL (ref 8.7–10.5)
CHLORIDE SERPL-SCNC: 105 MMOL/L (ref 95–110)
CO2 SERPL-SCNC: 21 MMOL/L (ref 23–29)
CREAT SERPL-MCNC: 0.7 MG/DL (ref 0.5–1.4)
DIFFERENTIAL METHOD: ABNORMAL
EOSINOPHIL # BLD AUTO: 0 K/UL (ref 0–0.5)
EOSINOPHIL NFR BLD: 0 % (ref 0–8)
ERYTHROCYTE [DISTWIDTH] IN BLOOD BY AUTOMATED COUNT: 15.2 % (ref 11.5–14.5)
EST. GFR  (AFRICAN AMERICAN): >60 ML/MIN/1.73 M^2
EST. GFR  (NON AFRICAN AMERICAN): >60 ML/MIN/1.73 M^2
GLUCOSE SERPL-MCNC: 90 MG/DL (ref 70–110)
HCT VFR BLD AUTO: 30.4 % (ref 37–48.5)
HGB BLD-MCNC: 9.5 G/DL (ref 12–16)
IMM GRANULOCYTES # BLD AUTO: 0.05 K/UL (ref 0–0.04)
IMM GRANULOCYTES NFR BLD AUTO: 0.7 % (ref 0–0.5)
LYMPHOCYTES # BLD AUTO: 2.2 K/UL (ref 1–4.8)
LYMPHOCYTES NFR BLD: 30.7 % (ref 18–48)
MCH RBC QN AUTO: 24.5 PG (ref 27–31)
MCHC RBC AUTO-ENTMCNC: 31.3 G/DL (ref 32–36)
MCV RBC AUTO: 79 FL (ref 82–98)
MONOCYTES # BLD AUTO: 0.6 K/UL (ref 0.3–1)
MONOCYTES NFR BLD: 8.6 % (ref 4–15)
NEUTROPHILS # BLD AUTO: 4.2 K/UL (ref 1.8–7.7)
NEUTROPHILS NFR BLD: 59.9 % (ref 38–73)
NRBC BLD-RTO: 0 /100 WBC
PLATELET # BLD AUTO: 288 K/UL (ref 150–350)
PMV BLD AUTO: 9 FL (ref 9.2–12.9)
POTASSIUM SERPL-SCNC: 3.5 MMOL/L (ref 3.5–5.1)
PROT SERPL-MCNC: 6.4 G/DL (ref 6–8.4)
RBC # BLD AUTO: 3.87 M/UL (ref 4–5.4)
SARS-COV-2 RDRP RESP QL NAA+PROBE: NEGATIVE
SODIUM SERPL-SCNC: 136 MMOL/L (ref 136–145)
WBC # BLD AUTO: 7.06 K/UL (ref 3.9–12.7)

## 2020-12-13 PROCEDURE — 25000003 PHARM REV CODE 250

## 2020-12-13 PROCEDURE — 36415 COLL VENOUS BLD VENIPUNCTURE: CPT

## 2020-12-13 PROCEDURE — 63600175 PHARM REV CODE 636 W HCPCS: Performed by: ANESTHESIOLOGY

## 2020-12-13 PROCEDURE — 72100002 HC LABOR CARE, 1ST 8 HOURS

## 2020-12-13 PROCEDURE — 58611 LIGATE OVIDUCT(S) ADD-ON: CPT | Mod: ,,, | Performed by: OBSTETRICS & GYNECOLOGY

## 2020-12-13 PROCEDURE — 25000003 PHARM REV CODE 250: Performed by: OBSTETRICS & GYNECOLOGY

## 2020-12-13 PROCEDURE — 59514 CESAREAN DELIVERY ONLY: CPT | Mod: GB,,, | Performed by: OBSTETRICS & GYNECOLOGY

## 2020-12-13 PROCEDURE — 36000679 HC C/S TUBAL LIGATION, UNSCHEDULED: Mod: SZN

## 2020-12-13 PROCEDURE — 27800516 HC TRAY, EPIDURAL COMBO: Performed by: ANESTHESIOLOGY

## 2020-12-13 PROCEDURE — 58611 PR LIGATION,FALLOPIAN TUBE W/C-SECTION: ICD-10-PCS | Mod: ,,, | Performed by: OBSTETRICS & GYNECOLOGY

## 2020-12-13 PROCEDURE — 99201 *HC E&M-NEW PATIENT-LVL I: CPT

## 2020-12-13 PROCEDURE — 27000181 HC CABLE, IUPC

## 2020-12-13 PROCEDURE — 63600175 PHARM REV CODE 636 W HCPCS: Performed by: OBSTETRICS & GYNECOLOGY

## 2020-12-13 PROCEDURE — 86901 BLOOD TYPING SEROLOGIC RH(D): CPT

## 2020-12-13 PROCEDURE — 36000684 HC CESAREAN SECTION, UNSCHEDULED

## 2020-12-13 PROCEDURE — 59514 PR CESAREAN DELIVERY ONLY: ICD-10-PCS | Mod: GB,,, | Performed by: OBSTETRICS & GYNECOLOGY

## 2020-12-13 PROCEDURE — 85025 COMPLETE CBC W/AUTO DIFF WBC: CPT

## 2020-12-13 PROCEDURE — 37000009 HC ANESTHESIA EA ADD 15 MINS: Mod: SZN

## 2020-12-13 PROCEDURE — 37000009 HC ANESTHESIA EA ADD 15 MINS: Performed by: OBSTETRICS & GYNECOLOGY

## 2020-12-13 PROCEDURE — 80053 COMPREHEN METABOLIC PANEL: CPT

## 2020-12-13 PROCEDURE — 62326 NJX INTERLAMINAR LMBR/SAC: CPT | Performed by: STUDENT IN AN ORGANIZED HEALTH CARE EDUCATION/TRAINING PROGRAM

## 2020-12-13 PROCEDURE — 27200710 HC EPIDURAL INFUSION PUMP SET: Performed by: ANESTHESIOLOGY

## 2020-12-13 PROCEDURE — U0002 COVID-19 LAB TEST NON-CDC: HCPCS

## 2020-12-13 PROCEDURE — 51702 INSERT TEMP BLADDER CATH: CPT

## 2020-12-13 PROCEDURE — 11000001 HC ACUTE MED/SURG PRIVATE ROOM

## 2020-12-13 PROCEDURE — 25000003 PHARM REV CODE 250: Performed by: STUDENT IN AN ORGANIZED HEALTH CARE EDUCATION/TRAINING PROGRAM

## 2020-12-13 PROCEDURE — 25000003 PHARM REV CODE 250: Performed by: ANESTHESIOLOGY

## 2020-12-13 PROCEDURE — 72100003 HC LABOR CARE, EA. ADDL. 8 HRS

## 2020-12-13 PROCEDURE — 37000008 HC ANESTHESIA 1ST 15 MINUTES: Performed by: OBSTETRICS & GYNECOLOGY

## 2020-12-13 RX ORDER — OXYTOCIN/RINGER'S LACTATE 30/500 ML
95 PLASTIC BAG, INJECTION (ML) INTRAVENOUS ONCE
Status: DISCONTINUED | OUTPATIENT
Start: 2020-12-13 | End: 2020-12-14

## 2020-12-13 RX ORDER — PHENYLEPHRINE HYDROCHLORIDE 10 MG/ML
INJECTION INTRAVENOUS
Status: DISCONTINUED | OUTPATIENT
Start: 2020-12-13 | End: 2020-12-13

## 2020-12-13 RX ORDER — SODIUM CITRATE AND CITRIC ACID MONOHYDRATE 334; 500 MG/5ML; MG/5ML
30 SOLUTION ORAL ONCE
Status: DISCONTINUED | OUTPATIENT
Start: 2020-12-13 | End: 2020-12-14

## 2020-12-13 RX ORDER — SODIUM CITRATE AND CITRIC ACID MONOHYDRATE 334; 500 MG/5ML; MG/5ML
SOLUTION ORAL
Status: COMPLETED
Start: 2020-12-13 | End: 2020-12-13

## 2020-12-13 RX ORDER — TERBUTALINE SULFATE 1 MG/ML
INJECTION SUBCUTANEOUS
Status: DISPENSED
Start: 2020-12-13 | End: 2020-12-14

## 2020-12-13 RX ORDER — SODIUM CHLORIDE, SODIUM LACTATE, POTASSIUM CHLORIDE, CALCIUM CHLORIDE 600; 310; 30; 20 MG/100ML; MG/100ML; MG/100ML; MG/100ML
INJECTION, SOLUTION INTRAVENOUS CONTINUOUS PRN
Status: DISCONTINUED | OUTPATIENT
Start: 2020-12-13 | End: 2020-12-13

## 2020-12-13 RX ORDER — BUPIVACAINE HYDROCHLORIDE 2.5 MG/ML
20 INJECTION, SOLUTION EPIDURAL; INFILTRATION; INTRACAUDAL ONCE
Status: DISCONTINUED | OUTPATIENT
Start: 2020-12-13 | End: 2020-12-14

## 2020-12-13 RX ORDER — FENTANYL/ROPIVACAINE/NS/PF 2MCG/ML-.2
PLASTIC BAG, INJECTION (ML) INJECTION CONTINUOUS
Status: DISCONTINUED | OUTPATIENT
Start: 2020-12-13 | End: 2020-12-14

## 2020-12-13 RX ORDER — OXYTOCIN/RINGER'S LACTATE 30/500 ML
334 PLASTIC BAG, INJECTION (ML) INTRAVENOUS ONCE
Status: DISCONTINUED | OUTPATIENT
Start: 2020-12-13 | End: 2020-12-16 | Stop reason: HOSPADM

## 2020-12-13 RX ORDER — MISOPROSTOL 200 UG/1
800 TABLET ORAL
Status: DISCONTINUED | OUTPATIENT
Start: 2020-12-13 | End: 2020-12-16 | Stop reason: HOSPADM

## 2020-12-13 RX ORDER — OXYTOCIN/RINGER'S LACTATE 30/500 ML
0-20 PLASTIC BAG, INJECTION (ML) INTRAVENOUS CONTINUOUS
Status: DISCONTINUED | OUTPATIENT
Start: 2020-12-13 | End: 2020-12-14

## 2020-12-13 RX ORDER — MUPIROCIN 20 MG/G
OINTMENT TOPICAL
Status: COMPLETED
Start: 2020-12-13 | End: 2020-12-13

## 2020-12-13 RX ORDER — SIMETHICONE 80 MG
1 TABLET,CHEWABLE ORAL 4 TIMES DAILY PRN
Status: DISCONTINUED | OUTPATIENT
Start: 2020-12-13 | End: 2020-12-14

## 2020-12-13 RX ORDER — SODIUM CHLORIDE, SODIUM LACTATE, POTASSIUM CHLORIDE, CALCIUM CHLORIDE 600; 310; 30; 20 MG/100ML; MG/100ML; MG/100ML; MG/100ML
INJECTION, SOLUTION INTRAVENOUS CONTINUOUS
Status: DISCONTINUED | OUTPATIENT
Start: 2020-12-13 | End: 2020-12-14

## 2020-12-13 RX ORDER — FAMOTIDINE 10 MG/ML
20 INJECTION INTRAVENOUS 2 TIMES DAILY
Status: DISCONTINUED | OUTPATIENT
Start: 2020-12-13 | End: 2020-12-14

## 2020-12-13 RX ORDER — ONDANSETRON 8 MG/1
8 TABLET, ORALLY DISINTEGRATING ORAL EVERY 8 HOURS PRN
Status: DISCONTINUED | OUTPATIENT
Start: 2020-12-13 | End: 2020-12-16 | Stop reason: HOSPADM

## 2020-12-13 RX ORDER — FENTANYL/ROPIVACAINE/NS/PF 2MCG/ML-.2
PLASTIC BAG, INJECTION (ML) INJECTION
Status: COMPLETED
Start: 2020-12-13 | End: 2020-12-13

## 2020-12-13 RX ORDER — CEFAZOLIN SODIUM 1 G/3ML
INJECTION, POWDER, FOR SOLUTION INTRAMUSCULAR; INTRAVENOUS
Status: DISCONTINUED | OUTPATIENT
Start: 2020-12-13 | End: 2020-12-13

## 2020-12-13 RX ORDER — KETOROLAC TROMETHAMINE 30 MG/ML
INJECTION, SOLUTION INTRAMUSCULAR; INTRAVENOUS
Status: DISCONTINUED | OUTPATIENT
Start: 2020-12-13 | End: 2020-12-13

## 2020-12-13 RX ORDER — LIDOCAINE HYDROCHLORIDE 20 MG/ML
INJECTION, SOLUTION EPIDURAL; INFILTRATION; INTRACAUDAL; PERINEURAL
Status: DISCONTINUED | OUTPATIENT
Start: 2020-12-13 | End: 2020-12-13

## 2020-12-13 RX ORDER — CEFAZOLIN SODIUM 2 G/50ML
2 SOLUTION INTRAVENOUS ONCE
Status: DISCONTINUED | OUTPATIENT
Start: 2020-12-13 | End: 2020-12-14

## 2020-12-13 RX ORDER — SODIUM CHLORIDE 9 MG/ML
INJECTION, SOLUTION INTRAVENOUS
Status: DISCONTINUED | OUTPATIENT
Start: 2020-12-13 | End: 2020-12-14

## 2020-12-13 RX ORDER — MORPHINE SULFATE 1 MG/ML
INJECTION, SOLUTION EPIDURAL; INTRATHECAL; INTRAVENOUS
Status: DISCONTINUED | OUTPATIENT
Start: 2020-12-13 | End: 2020-12-13

## 2020-12-13 RX ORDER — CALCIUM CARBONATE 200(500)MG
500 TABLET,CHEWABLE ORAL 3 TIMES DAILY PRN
Status: DISCONTINUED | OUTPATIENT
Start: 2020-12-13 | End: 2020-12-14

## 2020-12-13 RX ORDER — LIDOCAINE HYDROCHLORIDE AND EPINEPHRINE 15; 5 MG/ML; UG/ML
INJECTION, SOLUTION EPIDURAL
Status: DISCONTINUED | OUTPATIENT
Start: 2020-12-13 | End: 2020-12-13

## 2020-12-13 RX ORDER — TERBUTALINE SULFATE 1 MG/ML
0.25 INJECTION SUBCUTANEOUS ONCE
Status: COMPLETED | OUTPATIENT
Start: 2020-12-13 | End: 2020-12-13

## 2020-12-13 RX ORDER — OXYTOCIN/RINGER'S LACTATE 30/500 ML
PLASTIC BAG, INJECTION (ML) INTRAVENOUS
Status: DISCONTINUED | OUTPATIENT
Start: 2020-12-13 | End: 2020-12-13

## 2020-12-13 RX ORDER — FENTANYL CITRATE 50 UG/ML
INJECTION, SOLUTION INTRAMUSCULAR; INTRAVENOUS
Status: DISCONTINUED | OUTPATIENT
Start: 2020-12-13 | End: 2020-12-13

## 2020-12-13 RX ADMIN — LIDOCAINE HYDROCHLORIDE 5 ML: 20 INJECTION, SOLUTION EPIDURAL; INFILTRATION; INTRACAUDAL; PERINEURAL at 09:12

## 2020-12-13 RX ADMIN — Medication 3 UNITS: at 10:12

## 2020-12-13 RX ADMIN — FENTANYL CIT 0.2 MG/100ML-ROPIV 0.2%-NACL 0.9% EPIDURAL INJ 12 ML/HR: 2/0.2 SOLUTION at 02:12

## 2020-12-13 RX ADMIN — AZITHROMYCIN MONOHYDRATE 500 MG: 500 INJECTION, POWDER, LYOPHILIZED, FOR SOLUTION INTRAVENOUS at 09:12

## 2020-12-13 RX ADMIN — SODIUM CHLORIDE, SODIUM LACTATE, POTASSIUM CHLORIDE, AND CALCIUM CHLORIDE: .6; .31; .03; .02 INJECTION, SOLUTION INTRAVENOUS at 07:12

## 2020-12-13 RX ADMIN — Medication 2 MILLI-UNITS/MIN: at 12:12

## 2020-12-13 RX ADMIN — SODIUM CHLORIDE, SODIUM LACTATE, POTASSIUM CHLORIDE, AND CALCIUM CHLORIDE: .6; .31; .03; .02 INJECTION, SOLUTION INTRAVENOUS at 09:12

## 2020-12-13 RX ADMIN — TERBUTALINE SULFATE 0.25 MG: 1 INJECTION, SOLUTION SUBCUTANEOUS at 09:12

## 2020-12-13 RX ADMIN — FENTANYL CITRATE 100 MCG: 50 INJECTION, SOLUTION INTRAMUSCULAR; INTRAVENOUS at 09:12

## 2020-12-13 RX ADMIN — PHENYLEPHRINE HYDROCHLORIDE 100 MCG: 10 INJECTION INTRAVENOUS at 10:12

## 2020-12-13 RX ADMIN — LIDOCAINE HYDROCHLORIDE 5 ML: 20 INJECTION, SOLUTION EPIDURAL; INFILTRATION; INTRACAUDAL; PERINEURAL at 10:12

## 2020-12-13 RX ADMIN — LIDOCAINE HYDROCHLORIDE,EPINEPHRINE BITARTRATE 3 ML: 15; .005 INJECTION, SOLUTION EPIDURAL; INFILTRATION; INTRACAUDAL; PERINEURAL at 02:12

## 2020-12-13 RX ADMIN — MORPHINE SULFATE 1.5 MG: 1 INJECTION, SOLUTION EPIDURAL; INTRATHECAL; INTRAVENOUS at 10:12

## 2020-12-13 RX ADMIN — KETOROLAC TROMETHAMINE 60 MG: 30 INJECTION, SOLUTION INTRAMUSCULAR; INTRAVENOUS at 10:12

## 2020-12-13 RX ADMIN — MUPIROCIN: 20 OINTMENT TOPICAL at 09:12

## 2020-12-13 RX ADMIN — PHENYLEPHRINE HYDROCHLORIDE 100 MCG: 10 INJECTION INTRAVENOUS at 09:12

## 2020-12-13 RX ADMIN — SODIUM CITRATE AND CITRIC ACID MONOHYDRATE 30 ML: 500; 334 SOLUTION ORAL at 09:12

## 2020-12-13 RX ADMIN — CEFAZOLIN 2 G: 330 INJECTION, POWDER, FOR SOLUTION INTRAMUSCULAR; INTRAVENOUS at 09:12

## 2020-12-13 RX ADMIN — SODIUM CHLORIDE, SODIUM LACTATE, POTASSIUM CHLORIDE, AND CALCIUM CHLORIDE: .6; .31; .03; .02 INJECTION, SOLUTION INTRAVENOUS at 10:12

## 2020-12-13 RX ADMIN — FAMOTIDINE 20 MG: 10 INJECTION, SOLUTION INTRAVENOUS at 09:12

## 2020-12-13 NOTE — ANESTHESIA PROCEDURE NOTES
Epidural    Patient location during procedure: OB   Reason for block: primary anesthetic   Diagnosis: Pregnant   Start time: 12/13/2020 2:10 PM  Timeout: 12/13/2020 2:10 PM  End time: 12/13/2020 2:35 PM  Surgery related to: Pregnant    Staffing  Performing Provider: Agus Rendon MD  Authorizing Provider: Erasmo Gibson MD        Preanesthetic Checklist  Completed: patient identified, site marked, surgical consent, pre-op evaluation, timeout performed, IV checked, risks and benefits discussed, monitors and equipment checked, anesthesia consent given, hand hygiene performed and patient being monitored  Preparation  Patient position: sitting  Prep: ChloraPrep  Patient monitoring: ECG, Pulse Ox and Blood Pressure  Epidural  Skin Anesthetic: lidocaine 1%  Administration type: single shot  Approach: midline  Interspace: T10-11    Injection technique: BURTON saline  Needle and Epidural Catheter  Needle type: Tuohy   Needle gauge: 17  Needle length: 3.5 inches  Needle insertion depth: 8 cm  Catheter size: 19 G  Test dose: 3 mL of lidocaine 1.5% with Epi 1-to-200,000  Additional Documentation: incremental injection, negative aspiration for heme and CSF, no paresthesia on injection, no signs/symptoms of IV or SA injection, CSF aspirated and no significant pain on injection  Needle localization: anatomical landmarks and ultrasound guidance  Assessment  Upper dermatomal levels - Left: T10  Right: T10   Dermatomal levels determined by alcohol wipe  Ease of block: easy  Patient's tolerance of the procedure: comfortable throughout block and no complaintsNo inadvertent dural puncture with Tuohy.  Dural puncture not performed with spinal needle

## 2020-12-13 NOTE — NURSING
1400 Anesthesia at bedside for epidural.                                         1445 pt tolerated epidural procedure well, no spinal given.

## 2020-12-13 NOTE — ANESTHESIA PREPROCEDURE EVALUATION
2020  Miguel Angel Moreno is a 25 y.o., female w/ an IUP at 39w a significant PMHx of di-di twins, transient hypertension in third trimester, anemia.    History reviewed. No pertinent past medical history.     Past Surgical History:   Procedure Laterality Date     SECTION Left 2019    Procedure:  SECTION;  Surgeon: Ana Laura Douglas MD;  Location: [facility];  Service: OB/GYN;  Laterality: Left;     Review of patient's allergies indicates:   Allergen Reactions    Tree nut Hives     PEANUTS!!!       Current Facility-Administered Medications:     0.9%  NaCl infusion, , Intrauterine, PRN, David Lora MD    calcium carbonate 200 mg calcium (500 mg) chewable tablet 500 mg, 500 mg, Oral, TID PRN, David Lora MD    lactated ringers infusion, , Intravenous, Continuous, David Lora MD    lactated ringers infusion, , Intravenous, Continuous, David Lora MD    miSOPROStoL tablet 800 mcg, 800 mcg, Rectal, PRN, David Lora MD    ondansetron disintegrating tablet 8 mg, 8 mg, Oral, Q8H PRN, David Lora MD    oxytocin 30 units in 500 mL lactated ringers infusion (non-titrating), 334 [place]-units/min, Intravenous, Once, David Lora MD    oxytocin 30 units in 500 mL lactated ringers infusion (non-titrating), 95 [place]-units/min, Intravenous, Once, David Lora MD    oxytocin 30 units in 500 mL lactated ringers infusion (titrating), 0-20 [place]-units/min, Intravenous, Continuous, David Lora MD    promethazine (PHENERGAN) 12.5 mg in dextrose 5 % 50 mL IVPB, 12.5 mg, Intravenous, Q6H PRN, David Lora MD    simethicone chewable tablet 80 mg, 1 tablet, Oral, QID PRN, David Lora MD    Anesthesia Evaluation    I have reviewed the Patient Summary Reports.    I have reviewed the Nursing Notes. I have reviewed the NPO  Status.      Review of Systems  Anesthesia Hx:  No problems with previous Anesthesia  Neg history of prior surgery. Denies Family Hx of Anesthesia complications.   Denies Personal Hx of Anesthesia complications.   Social:  No Alcohol Use, Non-Smoker    Hematology/Oncology:     Oncology Normal    -- Anemia:   EENT/Dental:EENT/Dental Normal   Cardiovascular:   Hypertension Transient HTN during 3rd trimester of previous pregnancy   Pulmonary:  Pulmonary Normal    Renal/:  Renal/ Normal     Hepatic/GI:  Hepatic/GI Normal    Musculoskeletal:  Musculoskeletal Normal    Neurological:  Neurology Normal    Endocrine:  Endocrine Normal    Dermatological:  Skin Normal    Psych:  Psychiatric Normal           Physical Exam  General:  Well nourished    Airway/Jaw/Neck:  Airway Findings: Mouth Opening: Normal Tongue: Normal  General Airway Assessment: Adult  Mallampati: II  TM Distance: Normal, at least 6 cm        Eyes/Ears/Nose:  EYES/EARS/NOSE FINDINGS: Normal   Dental:  Braces top and bottom   Chest/Lungs:  Chest/Lungs Findings: Clear to auscultation, Normal Respiratory Rate     Heart/Vascular:  Heart Findings: Rate: Normal  Rhythm: Regular Rhythm     Abdomen:  Abdomen Findings: Normal    Musculoskeletal:  Musculoskeletal Findings: Normal   Skin:  Skin Findings: Normal    Mental Status:  Mental Status Findings:  Cooperative, Alert and Oriented         Anesthesia Plan  Type of Anesthesia, risks & benefits discussed:  Anesthesia Type:  CSE, epidural, spinal, general  Patient's Preference:   Intra-op Monitoring Plan: standard ASA monitors  Intra-op Monitoring Plan Comments:   Post Op Pain Control Plan: multimodal analgesia  Post Op Pain Control Plan Comments:   Induction:    Beta Blocker:  Patient is not currently on a Beta-Blocker (No further documentation required).       Informed Consent: Patient understands risks and agrees with Anesthesia plan.  Questions answered. Anesthesia consent signed with patient.  ASA Score: 2      Day of Surgery Review of History & Physical:  There are no significant changes.          Ready For Surgery From Anesthesia Perspective.

## 2020-12-13 NOTE — NURSING
Dr barajas at bedside,Inserted IUPC, pt tolerated well.  5/50/-1 SVE by dr barajas. PIT off.     Pt turned to left side with peanut ball under right leg. [id number]

## 2020-12-13 NOTE — PROGRESS NOTES
Labor Note    S: patient is comfortable with epidural    O  Temp:  [97.9 °F (36.6 °C)-98.4 °F (36.9 °C)]   Pulse:  []   Resp:  [16]   BP: (114-148)/(58-94)   SpO2:  [98 %-100 %]    Gen: A&ox3, nAD  Abd: gravid  SVE: 50/-1  FHT: 130bpm, mod gt, +variable decels  [place]: irregular    AP: 26 yo now  female @ 39.4 wks with SROM at 930am on 2020 who desires .    -FHT Cat 2: patient turned by nurse - pitocin stopped - IUPC in place  -SROM, afebrile  -GBS karissa barajas MD

## 2020-12-13 NOTE — NURSING
Pt arrived via EMS. 39wk, c/o contractions every 10min. Grossly ruptured with meconium fluid. Dr Lora performed SVE . fluid bolus started. No complaints at this time. Pt previous section one year ago, states would like to

## 2020-12-13 NOTE — H&P
HPI:   Miguel Angel Moreno is a 25 y.o. female  at 39.4 wks EGA who presents here with SROM about 930am today.  Patient has h/o  at term x 2 followed by  for twin pregnancy. She desires trial of labor with this pregnancy.  Denies any significant PMH.     ROS:  GENERAL: Denies weight gain or weight loss. Feeling well overall.   SKIN: Denies rash or lesions.   HEAD: Denies head injury or headache.   CHEST: Denies chest pain or shortness of breath.   CARDIOVASCULAR: Denies palpitations or left sided chest pain.   ABDOMEN: No abdominal pain, constipation, diarrhea, nausea, vomiting or rectal bleeding.   URINARY: No frequency, dysuria, hematuria, or burning on urination.  REPRODUCTIVE: See HPI.     History reviewed. No pertinent past medical history.  Past Surgical History:   Procedure Laterality Date     SECTION Left 2019    Procedure:  SECTION;  Surgeon: Ana Laura Douglas MD;  Location: [facility] L&D;  Service: OB/GYN;  Laterality: Left;     Family History   Problem Relation Age of Onset    Hypertension Maternal Grandmother     Diabetes Maternal Grandmother     Breast cancer Neg Hx     Colon cancer Neg Hx     Ovarian cancer Neg Hx      Allergies: Tree nut       PE:     BP (!) 148/76   Pulse 88   LMP 2020 (Exact Date)   SpO2 99%   Breastfeeding No   APPEARANCE: Well nourished, well developed, in no acute distress.  ABDOMEN:  Gravid.   SVE: 2/50/-4  FHT: 150bpm, mod gt, +accels, infrequent variable decels  [place]: irregular  Meconium noted    Lab Results   Component Value Date    WBC 10.11 2020    HGB 9.8 (L) 2020    HCT 32.4 (L) 2020    MCV 81 (L) 2020     2020       O POS    Bedside US: cephalic presentation    Assessment:  IUP @ 39.4  Weeks, SROM, meconium noted    Plan:   Admit to L&D  FHT Cat 1  GBS neg  Plan: expectant management, if no cervical change in 2 hours, will start pitocin for augmentation  Consents reviewed  Wants  london Lora MD

## 2020-12-14 DIAGNOSIS — G89.18 POSTOPERATIVE PAIN: Primary | ICD-10-CM

## 2020-12-14 LAB
BASOPHILS # BLD AUTO: 0.01 K/UL (ref 0–0.2)
BASOPHILS NFR BLD: 0.1 % (ref 0–1.9)
DIFFERENTIAL METHOD: ABNORMAL
EOSINOPHIL # BLD AUTO: 0 K/UL (ref 0–0.5)
EOSINOPHIL NFR BLD: 0 % (ref 0–8)
ERYTHROCYTE [DISTWIDTH] IN BLOOD BY AUTOMATED COUNT: 15.2 % (ref 11.5–14.5)
HCT VFR BLD AUTO: 23.9 % (ref 37–48.5)
HGB BLD-MCNC: 7.5 G/DL (ref 12–16)
IMM GRANULOCYTES # BLD AUTO: 0.06 K/UL (ref 0–0.04)
IMM GRANULOCYTES NFR BLD AUTO: 0.4 % (ref 0–0.5)
LYMPHOCYTES # BLD AUTO: 1.8 K/UL (ref 1–4.8)
LYMPHOCYTES NFR BLD: 12 % (ref 18–48)
MCH RBC QN AUTO: 24.9 PG (ref 27–31)
MCHC RBC AUTO-ENTMCNC: 31.4 G/DL (ref 32–36)
MCV RBC AUTO: 79 FL (ref 82–98)
MONOCYTES # BLD AUTO: 1 K/UL (ref 0.3–1)
MONOCYTES NFR BLD: 7 % (ref 4–15)
NEUTROPHILS # BLD AUTO: 11.7 K/UL (ref 1.8–7.7)
NEUTROPHILS NFR BLD: 80.5 % (ref 38–73)
NRBC BLD-RTO: 0 /100 WBC
PLATELET # BLD AUTO: 230 K/UL (ref 150–350)
PMV BLD AUTO: 9.1 FL (ref 9.2–12.9)
RBC # BLD AUTO: 3.01 M/UL (ref 4–5.4)
WBC # BLD AUTO: 14.58 K/UL (ref 3.9–12.7)

## 2020-12-14 PROCEDURE — 36415 COLL VENOUS BLD VENIPUNCTURE: CPT

## 2020-12-14 PROCEDURE — 90686 IIV4 VACC NO PRSV 0.5 ML IM: CPT | Mod: SL | Performed by: OBSTETRICS & GYNECOLOGY

## 2020-12-14 PROCEDURE — 99231 SBSQ HOSP IP/OBS SF/LOW 25: CPT | Mod: ,,, | Performed by: OBSTETRICS & GYNECOLOGY

## 2020-12-14 PROCEDURE — 99231 PR SUBSEQUENT HOSPITAL CARE,LEVL I: ICD-10-PCS | Mod: ,,, | Performed by: OBSTETRICS & GYNECOLOGY

## 2020-12-14 PROCEDURE — 25000003 PHARM REV CODE 250: Performed by: OBSTETRICS & GYNECOLOGY

## 2020-12-14 PROCEDURE — 63600175 PHARM REV CODE 636 W HCPCS: Performed by: ANESTHESIOLOGY

## 2020-12-14 PROCEDURE — 85025 COMPLETE CBC W/AUTO DIFF WBC: CPT

## 2020-12-14 PROCEDURE — 25000003 PHARM REV CODE 250: Performed by: ANESTHESIOLOGY

## 2020-12-14 PROCEDURE — 63600175 PHARM REV CODE 636 W HCPCS: Mod: SL | Performed by: OBSTETRICS & GYNECOLOGY

## 2020-12-14 PROCEDURE — 11000001 HC ACUTE MED/SURG PRIVATE ROOM

## 2020-12-14 PROCEDURE — 90471 IMMUNIZATION ADMIN: CPT | Mod: VFC | Performed by: OBSTETRICS & GYNECOLOGY

## 2020-12-14 RX ORDER — IBUPROFEN 800 MG/1
800 TABLET ORAL EVERY 8 HOURS PRN
Qty: 30 TABLET | Refills: 0 | Status: SHIPPED | OUTPATIENT
Start: 2020-12-14 | End: 2021-12-14

## 2020-12-14 RX ORDER — OXYCODONE AND ACETAMINOPHEN 5; 325 MG/1; MG/1
1 TABLET ORAL EVERY 4 HOURS PRN
Status: DISCONTINUED | OUTPATIENT
Start: 2020-12-14 | End: 2020-12-16 | Stop reason: HOSPADM

## 2020-12-14 RX ORDER — MISOPROSTOL 200 UG/1
200 TABLET ORAL ONCE AS NEEDED
Status: DISCONTINUED | OUTPATIENT
Start: 2020-12-14 | End: 2020-12-16 | Stop reason: HOSPADM

## 2020-12-14 RX ORDER — MUPIROCIN 20 MG/G
OINTMENT TOPICAL 2 TIMES DAILY
Status: DISCONTINUED | OUTPATIENT
Start: 2020-12-14 | End: 2020-12-16 | Stop reason: HOSPADM

## 2020-12-14 RX ORDER — SODIUM CHLORIDE, SODIUM LACTATE, POTASSIUM CHLORIDE, CALCIUM CHLORIDE 600; 310; 30; 20 MG/100ML; MG/100ML; MG/100ML; MG/100ML
INJECTION, SOLUTION INTRAVENOUS CONTINUOUS
Status: DISCONTINUED | OUTPATIENT
Start: 2020-12-14 | End: 2020-12-16 | Stop reason: HOSPADM

## 2020-12-14 RX ORDER — ACETAMINOPHEN 325 MG/1
650 TABLET ORAL EVERY 6 HOURS
Status: COMPLETED | OUTPATIENT
Start: 2020-12-14 | End: 2020-12-14

## 2020-12-14 RX ORDER — IBUPROFEN 600 MG/1
600 TABLET ORAL EVERY 6 HOURS
Status: DISCONTINUED | OUTPATIENT
Start: 2020-12-15 | End: 2020-12-16 | Stop reason: HOSPADM

## 2020-12-14 RX ORDER — BISACODYL 10 MG
10 SUPPOSITORY, RECTAL RECTAL ONCE AS NEEDED
Status: DISCONTINUED | OUTPATIENT
Start: 2020-12-14 | End: 2020-12-16 | Stop reason: HOSPADM

## 2020-12-14 RX ORDER — PRENATAL WITH FERROUS FUM AND FOLIC ACID 3080; 920; 120; 400; 22; 1.84; 3; 20; 10; 1; 12; 200; 27; 25; 2 [IU]/1; [IU]/1; MG/1; [IU]/1; MG/1; MG/1; MG/1; MG/1; MG/1; MG/1; UG/1; MG/1; MG/1; MG/1; MG/1
1 TABLET ORAL DAILY
Status: DISCONTINUED | OUTPATIENT
Start: 2020-12-14 | End: 2020-12-16 | Stop reason: HOSPADM

## 2020-12-14 RX ORDER — ONDANSETRON 8 MG/1
8 TABLET, ORALLY DISINTEGRATING ORAL EVERY 8 HOURS PRN
Status: DISCONTINUED | OUTPATIENT
Start: 2020-12-14 | End: 2020-12-16 | Stop reason: HOSPADM

## 2020-12-14 RX ORDER — ONDANSETRON 2 MG/ML
4 INJECTION INTRAMUSCULAR; INTRAVENOUS EVERY 6 HOURS PRN
Status: ACTIVE | OUTPATIENT
Start: 2020-12-14 | End: 2020-12-15

## 2020-12-14 RX ORDER — OXYCODONE AND ACETAMINOPHEN 5; 325 MG/1; MG/1
1 TABLET ORAL EVERY 6 HOURS PRN
Qty: 30 TABLET | Refills: 0 | Status: SHIPPED | OUTPATIENT
Start: 2020-12-14

## 2020-12-14 RX ORDER — OXYCODONE HYDROCHLORIDE 5 MG/1
10 TABLET ORAL EVERY 4 HOURS PRN
Status: DISPENSED | OUTPATIENT
Start: 2020-12-14 | End: 2020-12-15

## 2020-12-14 RX ORDER — SIMETHICONE 80 MG
1 TABLET,CHEWABLE ORAL EVERY 6 HOURS PRN
Status: DISCONTINUED | OUTPATIENT
Start: 2020-12-14 | End: 2020-12-16 | Stop reason: HOSPADM

## 2020-12-14 RX ORDER — KETOROLAC TROMETHAMINE 30 MG/ML
30 INJECTION, SOLUTION INTRAMUSCULAR; INTRAVENOUS EVERY 6 HOURS
Status: COMPLETED | OUTPATIENT
Start: 2020-12-14 | End: 2020-12-14

## 2020-12-14 RX ORDER — DIPHENHYDRAMINE HCL 25 MG
25 CAPSULE ORAL EVERY 4 HOURS PRN
Status: DISCONTINUED | OUTPATIENT
Start: 2020-12-14 | End: 2020-12-16 | Stop reason: HOSPADM

## 2020-12-14 RX ORDER — HYDROCORTISONE 25 MG/G
CREAM TOPICAL 3 TIMES DAILY PRN
Status: DISCONTINUED | OUTPATIENT
Start: 2020-12-14 | End: 2020-12-16 | Stop reason: HOSPADM

## 2020-12-14 RX ORDER — DOCUSATE SODIUM 100 MG/1
200 CAPSULE, LIQUID FILLED ORAL 2 TIMES DAILY
Status: DISCONTINUED | OUTPATIENT
Start: 2020-12-14 | End: 2020-12-16 | Stop reason: HOSPADM

## 2020-12-14 RX ORDER — ADHESIVE BANDAGE
30 BANDAGE TOPICAL 2 TIMES DAILY PRN
Status: DISCONTINUED | OUTPATIENT
Start: 2020-12-14 | End: 2020-12-16 | Stop reason: HOSPADM

## 2020-12-14 RX ORDER — OXYCODONE HYDROCHLORIDE 5 MG/1
5 TABLET ORAL EVERY 4 HOURS PRN
Status: ACTIVE | OUTPATIENT
Start: 2020-12-14 | End: 2020-12-15

## 2020-12-14 RX ORDER — OXYCODONE AND ACETAMINOPHEN 10; 325 MG/1; MG/1
1 TABLET ORAL EVERY 4 HOURS PRN
Status: DISCONTINUED | OUTPATIENT
Start: 2020-12-14 | End: 2020-12-16 | Stop reason: HOSPADM

## 2020-12-14 RX ORDER — ACETAMINOPHEN 325 MG/1
650 TABLET ORAL EVERY 6 HOURS PRN
Status: DISCONTINUED | OUTPATIENT
Start: 2020-12-14 | End: 2020-12-16 | Stop reason: HOSPADM

## 2020-12-14 RX ORDER — OXYTOCIN/RINGER'S LACTATE 30/500 ML
95 PLASTIC BAG, INJECTION (ML) INTRAVENOUS ONCE
Status: DISCONTINUED | OUTPATIENT
Start: 2020-12-14 | End: 2020-12-16 | Stop reason: HOSPADM

## 2020-12-14 RX ADMIN — PRENATAL VIT W/ FE FUMARATE-FA TAB 27-0.8 MG 1 TABLET: 27-0.8 TAB at 08:12

## 2020-12-14 RX ADMIN — OXYCODONE HYDROCHLORIDE 10 MG: 5 TABLET ORAL at 12:12

## 2020-12-14 RX ADMIN — ACETAMINOPHEN 650 MG: 325 TABLET ORAL at 04:12

## 2020-12-14 RX ADMIN — KETOROLAC TROMETHAMINE 30 MG: 30 INJECTION, SOLUTION INTRAMUSCULAR at 11:12

## 2020-12-14 RX ADMIN — KETOROLAC TROMETHAMINE 30 MG: 30 INJECTION, SOLUTION INTRAMUSCULAR at 05:12

## 2020-12-14 RX ADMIN — DOCUSATE SODIUM 200 MG: 100 CAPSULE, LIQUID FILLED ORAL at 08:12

## 2020-12-14 RX ADMIN — INFLUENZA VIRUS VACCINE 0.5 ML: 15; 15; 15; 15 SUSPENSION INTRAMUSCULAR at 05:12

## 2020-12-14 RX ADMIN — MUPIROCIN: 20 OINTMENT TOPICAL at 08:12

## 2020-12-14 RX ADMIN — OXYCODONE HYDROCHLORIDE AND ACETAMINOPHEN 1 TABLET: 10; 325 TABLET ORAL at 08:12

## 2020-12-14 RX ADMIN — KETOROLAC TROMETHAMINE 30 MG: 30 INJECTION, SOLUTION INTRAMUSCULAR at 12:12

## 2020-12-14 RX ADMIN — ACETAMINOPHEN 650 MG: 325 TABLET ORAL at 11:12

## 2020-12-14 RX ADMIN — KETOROLAC TROMETHAMINE 30 MG: 30 INJECTION, SOLUTION INTRAMUSCULAR at 04:12

## 2020-12-14 RX ADMIN — OXYCODONE HYDROCHLORIDE 10 MG: 5 TABLET ORAL at 04:12

## 2020-12-14 RX ADMIN — ACETAMINOPHEN 650 MG: 325 TABLET ORAL at 12:12

## 2020-12-14 RX ADMIN — OXYCODONE HYDROCHLORIDE 10 MG: 5 TABLET ORAL at 08:12

## 2020-12-14 RX ADMIN — ACETAMINOPHEN 650 MG: 325 TABLET ORAL at 05:12

## 2020-12-14 NOTE — PROGRESS NOTES
12/13/20 2130   [facility] - [facility]   Strip Reviewed by Chandrika Nurse? Yes   [facility] Note - Communication   [place] Nurse Communicated with Bedside Nurse Regarding: Fetal Status   [facility]rika Note - Notification   Nurse Notified? Yes  (re: fhr tracing; adrianarn on phone with Dr Lora at this time)   Name of Nurse staff member   Doctor Notified?   (per l&d rn)

## 2020-12-14 NOTE — PLAN OF CARE
Patient with Padilla catheter throughout the night; will d/c @0600 as per MD order. Tolerating regular diet. IVF's infusing as ordered.  LTV incision with aquacel dressing c/d/i.  Patient reports pain controlled with ordered medications.  Lochia rubra and scant.  Fundus firm and at umbilicus. Plan of care reviewed with patient; questions answered/encouraged.  Bonding with baby AEB holding, feeding, dressing, and changing baby's diaper. Smiles appropriately at baby. Mother at bedside attentive and supportive of patient's and baby's needs. Call light in reach, side rails up x2, nonskid socks on, bed in lowest position with wheels locked.  Remains free from falls and/or injury. VSS. NAD noted. Will continue to monitor.

## 2020-12-14 NOTE — NURSING
Dr. Lora notified of pt's SVE. 6/60/-1. MD Guido notified that pt is having recurrent late decels with HR dropping to the 60s. Instructed to admin terbutaline. MD instructed to get pt ready for c/s.

## 2020-12-14 NOTE — ANESTHESIA POSTPROCEDURE EVALUATION
Anesthesia Post Evaluation    Patient: Miguel Angel Moreno    Procedure(s) Performed: Procedure(s) (LRB):   SECTION (N/A)    Final Anesthesia Type: epidural    Patient location during evaluation: labor & delivery  Patient participation: Yes- Able to Participate  Level of consciousness: awake and alert and oriented  Post-procedure vital signs: reviewed and stable  Pain management: adequate  Airway patency: patent    PONV status at discharge: No PONV  Anesthetic complications: no      Cardiovascular status: hemodynamically stable  Respiratory status: room air and spontaneous ventilation  Hydration status: euvolemic  Follow-up not needed.      No catheter in back  No headache/neckache/backache  Full return of neurological function  Able to urinate  Advised patient to report any new problems of back pain, especially with fever or decreasing bladder function occurring during coming days to weeks        Vitals Value Taken Time   /81 20 0748   Temp 36.7 °C (98.1 °F) 20 0748   Pulse 84 20 0748   Resp 16 20 0852   SpO2 100 % 20 0748         No case tracking events are documented in the log.      Pain/Elyssa Score: Pain Rating Prior to Med Admin: 7 (2020  8:52 AM)  Pain Rating Post Med Admin: 4 (2020  5:20 AM)

## 2020-12-14 NOTE — PROGRESS NOTES
POD #1    S: patient doing well. No complaints. Waiting to void. Min pain right now.    O  Temp:  [97.8 °F (36.6 °C)-99 °F (37.2 °C)]   Pulse:  []   Resp:  [16-20]   BP: (114-155)/()   SpO2:  [98 %-100 %]    Gen: A&ox3, nAD  Abd: soft, fundus firm and nontender at level of umbilicus, incision with bandage c/d/i  Ext: no edema    Lab Results   Component Value Date    WBC 14.58 (H) 2020    HGB 7.5 (L) 2020    HCT 23.9 (L) 2020    MCV 79 (L) 2020     2020       O POS    AP: 24 yo now  female s/p rLTCS/BTL, fetal intolerance to labor, EBL 1000cc, doing well.  Continue routine pp care  Padilla out.  Reg diet.  PO pain meds.  Ambulate.    giovanni barajas MD

## 2020-12-14 NOTE — PHYSICIAN QUERY
PT Name: Miguel Angel Moreno  MR #: [id number]    Relationship to Procedure Clarification     CDS/: RONALD Montoya,RNC-MNN         Contact information:[email or web address]     This form is a permanent document in the medical record.     Query Date: December 14, 2020    Dear Provider,  By submitting this query, we are merely seeking further clarification of documentation. Please utilize your independent clinical judgment when addressing the question(s) below.    The medical record contains the following:  Supporting Clinical Findings Location in Medical Record   Uterine extension on the right side was reapproximated using vicryl suture    Procedure: rLTCS/BTL    Estimated Blood Loss: 1000cc    Complications: none L&D Delivery note 12/13       Please clarify if Uterine extension on the right side (as it relates to rLTCS/BTL) is:      [  ] Inherent/Integral to the procedure   [  ] Complication of the procedure   [  ] Present, but not a complication of the procedure   [ x ] Incidental finding, not clinically significant   [  ] Intended/required to complete procedure   [  ] Other (please specify): __________________   [  ] Clinically Undetermined       Please document in your progress notes daily for the duration of treatment until resolved and include in your discharge summary.

## 2020-12-14 NOTE — NURSING
Dr. Lora phoned unit for update. FHT'd 130s-140s. Moderate variability. Pt having early/variable decels with each contraction. WCT. MD Guido to be updated at 2200.

## 2020-12-14 NOTE — L&D DELIVERY NOTE
Operative Note    Date: 2020  Preoperative Diagnosis:  1) IUP @ 39.4 wks, h/o previous LTCS, SROM  2) Augmentation of labor  3) Fetal intolerance to labor  4) Desires permanent sterilization    Postoperative Diagnosis: same  Procedure: rLTCS/BTL  Type of Anesthesia: epidural  Surgeon: David Lora MD  Assistant Surgeon: Angeles Young  Specimen/Tissue Removed: intact 3 vessel cord placenta (discarded), portions of right and left fallopian tubes to pathology  Estimated Blood Loss: 1000cc  Urine Output: 250 cc clear urine at end of procedure  IV Fluids:1400 cc LR  Complications: none  Findings: viable female infant in cephalic presentation with APGARS 9 and 9; meconium; nuchal cord x 1, body cord, true knot noted; Normal appearing tubes and ovaries bilaterally.    TECHNIQUE:  The patient was taken out to the Operating Room where her epidural was found to be adequate.  She was then prepared and draped and in a normal sterile fashion.  A Pfannenstiel skin incision was made approximately 2 cm above the pubic bone.  Incision was started with the scalpel then taken down to underlying layer of adipose tissue to the fascia with the Bovie.   The fascia was incised in the midline then extended superiorly and laterally with the pickups and curved mayos. Superior aspect of the incision was grasped with Kocher clamps, elevated and the rectus muscles were dissected off the fascia using curved Mayos.  Similar technique was achieved in the lower abdominal incision.  The rectus muscle was then  in the midline.     Bladder blade was placed.  The vesicular peritoneum was identified and entered with pickups and entered sharply with Metzenbaums.  Bladder flap was then created digitally.  Bladder blade was replaced.  A scalpel was used to make a low transverse incision in the lower uterine segment.     The  was delivered using standard technique.  Cord was clamped and cut.  handed to awaiting nurse.  The  placenta were then delivered with gentle manual traction.    The uterus was exteriorized and cleared of all clots and debris.  The uterus was closed with 0 Vicryl suture in a running locked fashion.  A second layer of 0   Vicryl suture in imbrication form was used to gain hemostasis.   Uterine extension on the right side was reapproximated using vicryl suture.     Attention was turned to the patient's tubes for tubal ligation.  The left tube was identified up to the fimbria.  It was then grasped with the [place] approximately 3 cm from   the cornua.  An opening was made in the mesosalpinx with the Bovie and a portion of the tube was tied off with 0 catgut suture.  The knuckle of the tube   was then cut and sent to Pathology.  Hemostasis was noted.  Attention was then turned to the patient's right tube, which was also identified to the fimbria.    The tube was then regrasped approximately 3 cm from the cornua.  An opening was made in the mesosalpinx and catgut suture was used to tie off a knuckle of the   tube.  The tube was cut, sent to Pathology.  Hemostasis was noted.      Posterior cul-de-sac of David was irrigated of all clots and debris.  Uterus was then returned to the abdomen.  The gutters were cleared of all clots and debris.    Attention was turned to the tubes which were reevaluated after placement of the uterus and they were both noted to be hemostatic.  The peritoneum and rectus   muscles were then reapproximated using 0 Vicryl suture.  Bupivacaine was injected below the fascia for local anesthesia.  The fascia was then closed with 0 Vicryl suture in a running fashion.  Subcutaneous tissue was then closed with 2-0 Vicryl suture in a running fashion.  The skin was then closed with 4-0 Monocryl suture in a running fashion.     The patient tolerated the procedure well.  Sponge, lap and needle counts were correct x3.  The patient received 2 g of Ancef IV and 500 mg of azithromycin IV   prior to starting  the surgery.  She was taken to the PACU in stable condition.       Medicaid tubal form signed in the OR.    David Lora MD

## 2020-12-14 NOTE — PLAN OF CARE
POC reviewed with pt around 0745; verbalized acceptance and understanding.  Pt's VS stable.  Remains free from falls and injury.  Able to void spontaneously without difficulty post jorgensen catheter removal.  Pain controlled with ordered meds.  Bonding well with baby.  Baby tolerating feedings; voiding/stooling appropriately.  Family at bedside to offer support.  WCTM.      CDC info sheet given on flu vaccine; pt wants vaccine.

## 2020-12-14 NOTE — PHYSICIAN QUERY
PT Name: Miguel Angel Moreno  MR #: [id number]    HEMATOLOGY CLARIFICATION      CDS/: RONALD Montoya,RNC-MNN       Contact information:[email or web address]    This form is a permanent document in the medical record.      Query Date: December 14, 2020    By submitting this query, we are merely seeking further clarification of documentation. Please utilize your independent clinical judgment when addressing the question(s) below.    The Medical Record contains the following:   Indicators  Supporting Clinical Findings Location in Medical Record    Anemia documented     X H&H Hgb=9.5-->7.5  Hct=30.4-->23.9 LAB 12/13-12/14    BP                    HR      GI bleeding documented     X Acute bleeding (Non GI site) Estimated Blood Loss: 1000cc L&D Delivery note 12/13    Transfusion(s)     X Acute/Chronic illness s/p rLTCS/BTL, fetal intolerance to labor, EBL 1000cc, doing well. OB Progress note 12/14@813am    Treatments      Other       Provider, please specify diagnosis or diagnoses associated with above clinical findings.   [   ] Acute blood loss anemia    [ x ] Acute blood loss anemia expected post-operatively    [   ] Other Hematological Diagnosis (please specify): _________________   [   ] Clinically Undetermined     Present on admission (POA) status:   [   ] Yes (Y)   [   ] No (N)   [   ] Documentation insufficient to determine if condition is POA (U)   [ x ] Clinically Undetermined (W)          Please document in your progress notes daily for the duration of treatment, until resolved, and include in your discharge summary.    Form No. [id number]

## 2020-12-14 NOTE — NURSING TRANSFER
Nursing Transfer Note      12/14/2020     Transfer To:     Transfer via bed    Transfer with baby, belongings    Transported by RN    Medicines sent: yes    Chart send with patient: Yes    Notified: s/o at bedsdide    Upon arrival to floor: patient oriented to room, call bell in reach and bed in lowest position, report given to WEI Ortiz

## 2020-12-14 NOTE — TRANSFER OF CARE
"Anesthesia Transfer of Care Note    Patient: Miguel Angel Moreno    Procedure(s) Performed:  Section    Patient location: Labor and Delivery    Anesthesia Type: epidural    Transport from OR: Transported from OR on room air with adequate spontaneous ventilation    Post pain: adequate analgesia    Post assessment: no apparent anesthetic complications and tolerated procedure well    Post vital signs: stable    Level of consciousness: awake, alert and oriented    Nausea/Vomiting: no nausea/vomiting    Complications: none    Transfer of care protocol was followed      Last vitals:   Visit Vitals  /80   Pulse 70   Temp 36.6 °C (97.8 °F) (Oral)   Resp 16   Ht 5' 1" (1.549 m)   Wt 79.4 kg (175 lb)   LMP 2020 (Exact Date)   SpO2 100%   Breastfeeding No   BMI 33.07 kg/m²     "

## 2020-12-14 NOTE — PROGRESS NOTES
24 yo  female @ 39.4 wks admitted with SROM, desired .  Fetus with intolerance to labor and patient remote from delivery - cervix still 5-6cm.  Will proceed to OR for rLTCS.    giovanni barajas MD

## 2020-12-15 ENCOUNTER — TELEPHONE (OUTPATIENT)
Dept: OBSTETRICS AND GYNECOLOGY | Facility: CLINIC | Age: 25
End: 2020-12-15

## 2020-12-15 VITALS
BODY MASS INDEX: 33.04 KG/M2 | DIASTOLIC BLOOD PRESSURE: 82 MMHG | HEART RATE: 72 BPM | RESPIRATION RATE: 16 BRPM | HEIGHT: 61 IN | SYSTOLIC BLOOD PRESSURE: 130 MMHG | OXYGEN SATURATION: 100 % | WEIGHT: 175 LBS | TEMPERATURE: 98 F

## 2020-12-15 PROCEDURE — 99238 HOSP IP/OBS DSCHRG MGMT 30/<: CPT | Mod: ,,, | Performed by: OBSTETRICS & GYNECOLOGY

## 2020-12-15 PROCEDURE — 99238 PR HOSPITAL DISCHARGE DAY,<30 MIN: ICD-10-PCS | Mod: ,,, | Performed by: OBSTETRICS & GYNECOLOGY

## 2020-12-15 PROCEDURE — 88302 TISSUE EXAM BY PATHOLOGIST: CPT | Mod: 26,,, | Performed by: PATHOLOGY

## 2020-12-15 PROCEDURE — 88302 PR  SURG PATH,LEVEL II: ICD-10-PCS | Mod: 26,,, | Performed by: PATHOLOGY

## 2020-12-15 PROCEDURE — 25000003 PHARM REV CODE 250: Performed by: OBSTETRICS & GYNECOLOGY

## 2020-12-15 PROCEDURE — 88302 TISSUE EXAM BY PATHOLOGIST: CPT | Mod: SZN | Performed by: PATHOLOGY

## 2020-12-15 RX ADMIN — MUPIROCIN: 20 OINTMENT TOPICAL at 09:12

## 2020-12-15 RX ADMIN — IBUPROFEN 600 MG: 600 TABLET, FILM COATED ORAL at 05:12

## 2020-12-15 RX ADMIN — OXYCODONE HYDROCHLORIDE AND ACETAMINOPHEN 1 TABLET: 5; 325 TABLET ORAL at 09:12

## 2020-12-15 RX ADMIN — PRENATAL VIT W/ FE FUMARATE-FA TAB 27-0.8 MG 1 TABLET: 27-0.8 TAB at 09:12

## 2020-12-15 RX ADMIN — IBUPROFEN 600 MG: 600 TABLET, FILM COATED ORAL at 12:12

## 2020-12-15 RX ADMIN — DOCUSATE SODIUM 200 MG: 100 CAPSULE, LIQUID FILLED ORAL at 09:12

## 2020-12-15 RX ADMIN — OXYCODONE HYDROCHLORIDE AND ACETAMINOPHEN 1 TABLET: 10; 325 TABLET ORAL at 05:12

## 2020-12-15 NOTE — NURSING
1235pm=  Written discharge instructions given and explained to pt.  Also reviewed Mother-baby care guide with pt.   Questions encouraged and answered.  Pt verbalized understanding.  Pt reports received home Rx meds from [facility].  Explanation for use of meds given and explained to pt, and informed pt to follow pharmacy's instructions for how to take meds.  Verbally informed and written in pt's DC instruction papers that pt last received Motrin today at 1226pm.    Pt verbalized understanding.   States she feels comfortable taking care of baby and has demonstrated ability to care for  and self.  Says she will have assistance when she returns home.

## 2020-12-15 NOTE — PLAN OF CARE
Patient ambulating independently. POC reviewed with pt; able to verbalize acceptance and understanding. Questions answered/encouraged; reassurance provided. Tolerating regular diet.  LTV incision with aquacel dressing c/d/i. Voiding and passing flatus.  Pain well controlled with ordered medications. Lochia rubra and light.  Fundus firm and at umbilicus. Bonding with baby AEB holding, feeding, dressing, and changing baby's diaper. Smiles appropriately at baby. Call light in reach, side rails up x2, nonskid socks on, bed in lowest position with wheels locked.  Remains free from falls and/or injury. VSS. NAD noted. Will continue to monitor.

## 2020-12-15 NOTE — PLAN OF CARE
spoke with patient via room phone and completed discharge assessment.  Patient reported she lives with her significant other Isaac Anthony [phone] and her baby girl. Patient has a 6 year old son but he lives in [facility] with her mother Chrissy Moreno.    Patient reported she is unemployed. She receives WIC and SNAP benefits. She intends on breast feeding. Her signifant other is providing transportation home. He is bringing the infant's car seat when he arrives.    Baby girl Fede Anthony was born via c section on 12/13/20 at 10:08PM. She weighed 6lb 7oz.    All questions answered. Patient did not have any questions. Denied community resources.       12/15/20 0992   Discharge Assessment   Assessment Type Discharge Planning Assessment   Confirmed/corrected address and phone number on facesheet? Yes   Assessment information obtained from? Patient   Expected Length of Stay (days) 2   Communicated expected length of stay with patient/caregiver yes   Prior to hospitilization cognitive status: Alert/Oriented   Prior to hospitalization functional status: Independent   Current cognitive status: Alert/Oriented   Current Functional Status: Independent   Lives With child(ladi), dependent;spouse   Able to Return to Prior Arrangements yes   Is patient able to care for self after discharge? Yes   Who are your caregiver(s) and their phone number(s)? SO: Isaac Lowe [phone]   Patient's perception of discharge disposition home or selfcare   Readmission Within the Last 30 Days no previous admission in last 30 days   Patient currently being followed by outpatient case management? No   Patient currently receives any other outside agency services? No   Equipment Currently Used at Home none   Do you have any problems affording any of your prescribed medications? No   Is the patient taking medications as prescribed? yes   Does the patient have transportation home? Yes   Transportation Anticipated family or friend  will provide   Does the patient receive services at the [facility]? No   Discharge Plan A Home   DME Needed Upon Discharge  none   Patient/Family in Agreement with Plan yes

## 2020-12-15 NOTE — TELEPHONE ENCOUNTER
----- Message from David Lora MD sent at 12/15/2020  7:48 AM CST -----  Regarding: add for pp visit  Please add this patient for pp visit/incision check for dec 22 at 230pm    Thanks    Dr lora

## 2020-12-16 NOTE — DISCHARGE INSTRUCTIONS
"Patient Discharge Instructions for Postpartum Women    Resume Regular Diet  Increase activity gradually, no heavy lifting  Shower only.  Do NOT sit in a tub of water, to bathe, until ok with doctor.  No tampons, douching or sexual intercourse for 6 weeks, or until ok with doctor.   Discuss birth control options with your physician.  Wear a support bra  Return to work/school when you've been cleared by a physician    Call your physician if     *Fever of 100.4 or higher  *Persistent nausea/ vomiting  *Incisional drainage (for C-Sections)  *Heavy vaginal bleeding or large clots (Heavy bleeding is soaking 1 pad in an hour)  *Swelling and pain in arms or legs  *Severe headaches, blurred vision or fainting  *Shortness of breath  *Frequency and burning with urination  *Signs of postpartum depression, discuss these signs with your physician    *Call your doctor with any problems or concerns, or go to the nearest Emergency Room.     Call lactation services for questions regarding feeding, nipple and breast care, and general questions about lactation.  They can be reached at [phone]         Understanding Postpartum Depression    You've just had a baby.  You know you should be excited and happy.  But instead you find yourself crying for no reason.  You may have trouble coping with your daily tasks.  You feel sad, tired, and hopeless most of the time.  You may even feel ashamed or guilty.  But what you're going through is not your fault and you can feel better.  Talk to your doctor.  He or she can help.    Depression After Childbirth    You may be weepy and tired right after giving birth.  These feelings are normal.  They're sometimes called the "baby blues."  These blues go away 2-3 weeks.  However, postpartum (meaning "after birth") depression lasts much longer and is more sever than the "baby blues."  It can make you feel sad and hopeless.  You may also fear that your baby will be harmed and worry about being a bad " mother.      What is Depression?    Depression is a mood disorder that affects the way you think and feel.  The most common symptom is a feeling of deep sadness.  You may also feel as if you just can't cope with life.    Other symptoms include:      * Gaining or loosing weight  * Sleeping too much or too little  * Feeling tired all the time  * Feeling restless  * Fears of harming your baby   * Lack of interest in your baby  * Feeling worthless or guilty  * No longer finding pleasure in things you used to  * Having trouble thinking clearly or making decisions  * Thoughts of hurting yourself or your baby    What Causes Postpartum Depression    The exact causes of postpartum depression isn't known.  It may be due to changes in your hormones during and after childbirth.  You may also be tired from caring for your baby and adjusting to being a mother.  All these factors may make you feel depressed.  In some cases, your genes may also play a role.    Depression Can Be Treated    The good news is that there are many ways to treat postpartum depression.  Talking to your doctor is the first step toward feeling better.    Resources:    * National [place] of Mental Health  -- 449-508-5427    www.nimh.nih.gov    * National [place] on Mental Illness --164.942.9054    Www.[place].org    * Mental Health [place] -- 620.235.9626     Www.nmha.org    * National Suicide Hotline --527.848.2746 (800-SUICIDE)    8849-9429 [employer]  All rights reserved.  This information is not intended as a substitute for professional medical care.  Always follow up with your healthcare professional's instructions.

## 2020-12-17 LAB
FINAL PATHOLOGIC DIAGNOSIS: NORMAL
GROSS: NORMAL
Lab: NORMAL

## 2020-12-22 ENCOUNTER — PATIENT MESSAGE (OUTPATIENT)
Dept: OBSTETRICS AND GYNECOLOGY | Facility: CLINIC | Age: 25
End: 2020-12-22

## 2021-04-26 ENCOUNTER — PATIENT MESSAGE (OUTPATIENT)
Dept: RESEARCH | Facility: HOSPITAL | Age: 26
End: 2021-04-26

## 2023-02-14 ENCOUNTER — OCCUPATIONAL HEALTH (OUTPATIENT)
Dept: URGENT CARE | Facility: CLINIC | Age: 28
End: 2023-02-14

## 2023-02-14 DIAGNOSIS — Z13.9 ENCOUNTER FOR SCREENING: Primary | ICD-10-CM

## 2023-02-14 LAB
CTP QC/QA: YES
POC 5 PANEL DRUG SCREEN: NEGATIVE

## 2023-02-14 PROCEDURE — 80305 DRUG TEST PRSMV DIR OPT OBS: CPT | Mod: S$GLB,,,

## 2023-02-14 PROCEDURE — 80305 POCT RAPID DRUG SCREEN 5 PANEL: ICD-10-PCS | Mod: S$GLB,,,

## 2023-02-14 PROCEDURE — 86580 POCT TB SKIN TEST: ICD-10-PCS | Mod: S$GLB,,,

## 2023-02-14 PROCEDURE — 86580 TB INTRADERMAL TEST: CPT | Mod: S$GLB,,,

## 2023-02-17 LAB
TB INDURATION - 48 HR READ: 0 MM
TB INDURATION - 72 HR READ: 0 MM
TB SKIN TEST - 48 HR READ: NEGATIVE
TB SKIN TEST - 72 HR READ: NEGATIVE

## 2023-03-14 ENCOUNTER — IMMUNIZATION (OUTPATIENT)
Dept: INTERNAL MEDICINE | Facility: CLINIC | Age: 28
End: 2023-03-14
Payer: MEDICAID

## 2023-03-14 DIAGNOSIS — Z23 NEED FOR VACCINATION: Primary | ICD-10-CM

## 2023-03-14 PROCEDURE — 0051A COVID-19, MRNA, LNP-S, PF, 30 MCG/0.3 ML DOSE VACCINE (PFIZER): CPT | Mod: PBBFAC,CV19

## 2023-07-05 ENCOUNTER — TELEPHONE (OUTPATIENT)
Dept: OBSTETRICS AND GYNECOLOGY | Facility: CLINIC | Age: 28
End: 2023-07-05
Payer: MEDICAID

## 2023-07-11 ENCOUNTER — TELEPHONE (OUTPATIENT)
Dept: OBSTETRICS AND GYNECOLOGY | Facility: CLINIC | Age: 28
End: 2023-07-11
Payer: MEDICAID

## 2023-07-11 NOTE — TELEPHONE ENCOUNTER
----- Message from Clair Montes De Oca MA sent at 7/5/2023 10:19 AM CDT -----  Please Advise, Thanks!   ----- Message -----  From: Evelyn Torres  Sent: 7/5/2023   9:30 AM CDT  To: Loni Blue Staff    .Type:  Needs Medical Advice    Who Called: pt    Would the patient rather a call back or a response via [facility]? Call back  Best Call Back Number: [phone]  Additional Information:     Pt stated she received a call to reschedule her appt and would like a call back please

## 2024-01-31 ENCOUNTER — PATIENT MESSAGE (OUTPATIENT)
Dept: OBSTETRICS AND GYNECOLOGY | Facility: CLINIC | Age: 29
End: 2024-01-31
Payer: MEDICAID

## (undated) DEVICE — DRESSING AQUACEL AG ADV 3.5X12